# Patient Record
Sex: FEMALE | Race: WHITE | NOT HISPANIC OR LATINO
[De-identification: names, ages, dates, MRNs, and addresses within clinical notes are randomized per-mention and may not be internally consistent; named-entity substitution may affect disease eponyms.]

---

## 2017-01-03 ENCOUNTER — MEDICATION RENEWAL (OUTPATIENT)
Age: 72
End: 2017-01-03

## 2017-02-06 ENCOUNTER — APPOINTMENT (OUTPATIENT)
Dept: INTERNAL MEDICINE | Facility: CLINIC | Age: 72
End: 2017-02-06

## 2017-02-14 ENCOUNTER — APPOINTMENT (OUTPATIENT)
Dept: INTERNAL MEDICINE | Facility: CLINIC | Age: 72
End: 2017-02-14

## 2017-02-14 VITALS
HEART RATE: 78 BPM | OXYGEN SATURATION: 98 % | DIASTOLIC BLOOD PRESSURE: 50 MMHG | SYSTOLIC BLOOD PRESSURE: 90 MMHG | TEMPERATURE: 98.2 F

## 2017-02-14 DIAGNOSIS — Z23 ENCOUNTER FOR IMMUNIZATION: ICD-10-CM

## 2017-02-14 DIAGNOSIS — L97.509 OTHER SPECIFIED DIABETES MELLITUS WITH FOOT ULCER: ICD-10-CM

## 2017-02-14 DIAGNOSIS — E13.621 OTHER SPECIFIED DIABETES MELLITUS WITH FOOT ULCER: ICD-10-CM

## 2017-02-14 DIAGNOSIS — M86.9 OSTEOMYELITIS, UNSPECIFIED: ICD-10-CM

## 2017-02-15 LAB
25(OH)D3 SERPL-MCNC: 22 NG/ML
ALBUMIN SERPL ELPH-MCNC: 3.3 G/DL
ALP BLD-CCNC: 138 U/L
ALT SERPL-CCNC: 13 U/L
ANION GAP SERPL CALC-SCNC: 14 MMOL/L
AST SERPL-CCNC: 22 U/L
BASOPHILS # BLD AUTO: 0.09 K/UL
BASOPHILS NFR BLD AUTO: 1.3 %
BILIRUB SERPL-MCNC: 0.2 MG/DL
BUN SERPL-MCNC: 19 MG/DL
CALCIUM SERPL-MCNC: 9.1 MG/DL
CHLORIDE SERPL-SCNC: 104 MMOL/L
CO2 SERPL-SCNC: 27 MMOL/L
CREAT SERPL-MCNC: 0.92 MG/DL
EOSINOPHIL # BLD AUTO: 0.51 K/UL
EOSINOPHIL NFR BLD AUTO: 7.3 %
GLUCOSE SERPL-MCNC: 86 MG/DL
HBA1C MFR BLD HPLC: 8 %
HCT VFR BLD CALC: 33.2 %
HGB BLD-MCNC: 9.7 G/DL
IMM GRANULOCYTES NFR BLD AUTO: 0.1 %
LYMPHOCYTES # BLD AUTO: 1.53 K/UL
LYMPHOCYTES NFR BLD AUTO: 22 %
MAN DIFF?: NORMAL
MCHC RBC-ENTMCNC: 27.6 PG
MCHC RBC-ENTMCNC: 29.2 GM/DL
MCV RBC AUTO: 94.3 FL
MONOCYTES # BLD AUTO: 0.52 K/UL
MONOCYTES NFR BLD AUTO: 7.5 %
NEUTROPHILS # BLD AUTO: 4.3 K/UL
NEUTROPHILS NFR BLD AUTO: 61.8 %
PLATELET # BLD AUTO: 274 K/UL
POTASSIUM SERPL-SCNC: 3.6 MMOL/L
PROT SERPL-MCNC: 6.9 G/DL
RBC # BLD: 3.52 M/UL
RBC # FLD: 16.2 %
SODIUM SERPL-SCNC: 145 MMOL/L
TSH SERPL-ACNC: 3.1 UIU/ML
VANCOMYCIN TROUGH SERPL-MCNC: 4 UG/ML
WBC # FLD AUTO: 6.96 K/UL

## 2017-02-16 PROBLEM — M86.9 OSTEOMYELITIS: Status: ACTIVE | Noted: 2017-02-16

## 2017-02-22 ENCOUNTER — APPOINTMENT (OUTPATIENT)
Dept: SURGERY | Facility: ASSISTED LIVING FACILITY | Age: 72
End: 2017-02-22

## 2017-03-01 ENCOUNTER — APPOINTMENT (OUTPATIENT)
Dept: SURGERY | Facility: ASSISTED LIVING FACILITY | Age: 72
End: 2017-03-01

## 2017-04-13 ENCOUNTER — INPATIENT (INPATIENT)
Facility: HOSPITAL | Age: 72
LOS: 14 days | Discharge: HOME | DRG: 853 | End: 2017-04-28
Attending: HOSPITALIST | Admitting: HOSPITALIST
Payer: MEDICARE

## 2017-04-13 VITALS
HEART RATE: 74 BPM | RESPIRATION RATE: 18 BRPM | DIASTOLIC BLOOD PRESSURE: 44 MMHG | OXYGEN SATURATION: 98 % | SYSTOLIC BLOOD PRESSURE: 86 MMHG | TEMPERATURE: 98 F

## 2017-04-13 DIAGNOSIS — Z71.89 OTHER SPECIFIED COUNSELING: ICD-10-CM

## 2017-04-13 DIAGNOSIS — R41.82 ALTERED MENTAL STATUS, UNSPECIFIED: ICD-10-CM

## 2017-04-13 DIAGNOSIS — R10.9 UNSPECIFIED ABDOMINAL PAIN: ICD-10-CM

## 2017-04-13 DIAGNOSIS — E10.9 TYPE 1 DIABETES MELLITUS WITHOUT COMPLICATIONS: ICD-10-CM

## 2017-04-13 DIAGNOSIS — I50.22 CHRONIC SYSTOLIC (CONGESTIVE) HEART FAILURE: ICD-10-CM

## 2017-04-13 DIAGNOSIS — F32.9 MAJOR DEPRESSIVE DISORDER, SINGLE EPISODE, UNSPECIFIED: ICD-10-CM

## 2017-04-13 DIAGNOSIS — Z41.8 ENCOUNTER FOR OTHER PROCEDURES FOR PURPOSES OTHER THAN REMEDYING HEALTH STATE: ICD-10-CM

## 2017-04-13 DIAGNOSIS — Z98.89 OTHER SPECIFIED POSTPROCEDURAL STATES: Chronic | ICD-10-CM

## 2017-04-13 DIAGNOSIS — A41.9 SEPSIS, UNSPECIFIED ORGANISM: ICD-10-CM

## 2017-04-13 LAB
ALBUMIN SERPL ELPH-MCNC: 2.3 G/DL — LOW (ref 3.3–5)
ALP SERPL-CCNC: 171 U/L — HIGH (ref 40–120)
ALT FLD-CCNC: 16 U/L RC — SIGNIFICANT CHANGE UP (ref 10–45)
ANION GAP SERPL CALC-SCNC: 18 MMOL/L — HIGH (ref 5–17)
APPEARANCE UR: CLEAR — SIGNIFICANT CHANGE UP
APTT BLD: 29.1 SEC — SIGNIFICANT CHANGE UP (ref 27.5–37.4)
AST SERPL-CCNC: 13 U/L — SIGNIFICANT CHANGE UP (ref 10–40)
BASOPHILS # BLD AUTO: 0 K/UL — SIGNIFICANT CHANGE UP (ref 0–0.2)
BASOPHILS NFR BLD AUTO: 0.2 % — SIGNIFICANT CHANGE UP (ref 0–2)
BILIRUB SERPL-MCNC: 0.3 MG/DL — SIGNIFICANT CHANGE UP (ref 0.2–1.2)
BILIRUB UR-MCNC: NEGATIVE — SIGNIFICANT CHANGE UP
BLD GP AB SCN SERPL QL: NEGATIVE — SIGNIFICANT CHANGE UP
BUN SERPL-MCNC: 54 MG/DL — HIGH (ref 7–23)
CALCIUM SERPL-MCNC: 8.9 MG/DL — SIGNIFICANT CHANGE UP (ref 8.4–10.5)
CHLORIDE SERPL-SCNC: 98 MMOL/L — SIGNIFICANT CHANGE UP (ref 96–108)
CK MB BLD-MCNC: 7.9 % — HIGH (ref 0–3.5)
CK MB CFR SERPL CALC: 1.5 NG/ML — SIGNIFICANT CHANGE UP (ref 0–3.8)
CK SERPL-CCNC: 19 U/L — LOW (ref 25–170)
CO2 SERPL-SCNC: 22 MMOL/L — SIGNIFICANT CHANGE UP (ref 22–31)
COLOR SPEC: YELLOW — SIGNIFICANT CHANGE UP
CREAT SERPL-MCNC: 1.97 MG/DL — HIGH (ref 0.5–1.3)
DIFF PNL FLD: NEGATIVE — SIGNIFICANT CHANGE UP
DIGOXIN SERPL-MCNC: 1.6 NG/ML — SIGNIFICANT CHANGE UP (ref 0.8–2)
EOSINOPHIL # BLD AUTO: 0 K/UL — SIGNIFICANT CHANGE UP (ref 0–0.5)
EOSINOPHIL NFR BLD AUTO: 0.2 % — SIGNIFICANT CHANGE UP (ref 0–6)
EPI CELLS # UR: SIGNIFICANT CHANGE UP /HPF
GAS PNL BLDV: SIGNIFICANT CHANGE UP
GAS PNL BLDV: SIGNIFICANT CHANGE UP
GLUCOSE SERPL-MCNC: 130 MG/DL — HIGH (ref 70–99)
GLUCOSE UR QL: NEGATIVE — SIGNIFICANT CHANGE UP
HCT VFR BLD CALC: 27.1 % — LOW (ref 34.5–45)
HGB BLD-MCNC: 8.8 G/DL — LOW (ref 11.5–15.5)
HYALINE CASTS # UR AUTO: ABNORMAL
INR BLD: 1.26 RATIO — HIGH (ref 0.88–1.16)
KETONES UR-MCNC: NEGATIVE — SIGNIFICANT CHANGE UP
LEUKOCYTE ESTERASE UR-ACNC: NEGATIVE — SIGNIFICANT CHANGE UP
LIDOCAIN IGE QN: 8 U/L — SIGNIFICANT CHANGE UP (ref 7–60)
LYMPHOCYTES # BLD AUTO: 0.7 K/UL — LOW (ref 1–3.3)
LYMPHOCYTES # BLD AUTO: 3.6 % — LOW (ref 13–44)
MAGNESIUM SERPL-MCNC: 2.3 MG/DL — SIGNIFICANT CHANGE UP (ref 1.6–2.6)
MCHC RBC-ENTMCNC: 27.9 PG — SIGNIFICANT CHANGE UP (ref 27–34)
MCHC RBC-ENTMCNC: 32.6 GM/DL — SIGNIFICANT CHANGE UP (ref 32–36)
MCV RBC AUTO: 85.5 FL — SIGNIFICANT CHANGE UP (ref 80–100)
MONOCYTES # BLD AUTO: 0.8 K/UL — SIGNIFICANT CHANGE UP (ref 0–0.9)
MONOCYTES NFR BLD AUTO: 3.9 % — SIGNIFICANT CHANGE UP (ref 2–14)
NEUTROPHILS # BLD AUTO: 18.3 K/UL — HIGH (ref 1.8–7.4)
NEUTROPHILS NFR BLD AUTO: 92.2 % — HIGH (ref 43–77)
NITRITE UR-MCNC: NEGATIVE — SIGNIFICANT CHANGE UP
PH UR: 5.5 — SIGNIFICANT CHANGE UP (ref 4.8–8)
PHOSPHATE SERPL-MCNC: 3.2 MG/DL — SIGNIFICANT CHANGE UP (ref 2.5–4.5)
PLATELET # BLD AUTO: 431 K/UL — HIGH (ref 150–400)
POTASSIUM SERPL-MCNC: 4 MMOL/L — SIGNIFICANT CHANGE UP (ref 3.5–5.3)
POTASSIUM SERPL-SCNC: 4 MMOL/L — SIGNIFICANT CHANGE UP (ref 3.5–5.3)
PROT SERPL-MCNC: 6.2 G/DL — SIGNIFICANT CHANGE UP (ref 6–8.3)
PROT UR-MCNC: SIGNIFICANT CHANGE UP
PROTHROM AB SERPL-ACNC: 13.7 SEC — HIGH (ref 9.8–12.7)
RBC # BLD: 3.17 M/UL — LOW (ref 3.8–5.2)
RBC # FLD: 15.1 % — HIGH (ref 10.3–14.5)
RBC CASTS # UR COMP ASSIST: SIGNIFICANT CHANGE UP /HPF (ref 0–2)
RH IG SCN BLD-IMP: POSITIVE — SIGNIFICANT CHANGE UP
SODIUM SERPL-SCNC: 138 MMOL/L — SIGNIFICANT CHANGE UP (ref 135–145)
SP GR SPEC: 1.01 — SIGNIFICANT CHANGE UP (ref 1.01–1.02)
TROPONIN T SERPL-MCNC: 0.13 NG/ML — HIGH (ref 0–0.06)
UROBILINOGEN FLD QL: NEGATIVE — SIGNIFICANT CHANGE UP
WBC # BLD: 19.9 K/UL — HIGH (ref 3.8–10.5)
WBC # FLD AUTO: 19.9 K/UL — HIGH (ref 3.8–10.5)
WBC UR QL: SIGNIFICANT CHANGE UP /HPF (ref 0–5)

## 2017-04-13 PROCEDURE — 70250 X-RAY EXAM OF SKULL: CPT | Mod: 26

## 2017-04-13 PROCEDURE — 74177 CT ABD & PELVIS W/CONTRAST: CPT | Mod: 26

## 2017-04-13 PROCEDURE — 71010: CPT | Mod: 26

## 2017-04-13 PROCEDURE — 99285 EMERGENCY DEPT VISIT HI MDM: CPT | Mod: GC

## 2017-04-13 PROCEDURE — 74000: CPT | Mod: 26

## 2017-04-13 PROCEDURE — 70450 CT HEAD/BRAIN W/O DYE: CPT | Mod: 26

## 2017-04-13 RX ORDER — INSULIN GLARGINE 100 [IU]/ML
14 INJECTION, SOLUTION SUBCUTANEOUS ONCE
Qty: 0 | Refills: 0 | Status: DISCONTINUED | OUTPATIENT
Start: 2017-04-13 | End: 2017-04-13

## 2017-04-13 RX ORDER — DEXTROSE 50 % IN WATER 50 %
25 SYRINGE (ML) INTRAVENOUS ONCE
Qty: 0 | Refills: 0 | Status: DISCONTINUED | OUTPATIENT
Start: 2017-04-13 | End: 2017-04-24

## 2017-04-13 RX ORDER — SODIUM CHLORIDE 9 MG/ML
1000 INJECTION, SOLUTION INTRAVENOUS
Qty: 0 | Refills: 0 | Status: DISCONTINUED | OUTPATIENT
Start: 2017-04-13 | End: 2017-04-24

## 2017-04-13 RX ORDER — HEPARIN SODIUM 5000 [USP'U]/ML
5000 INJECTION INTRAVENOUS; SUBCUTANEOUS EVERY 8 HOURS
Qty: 0 | Refills: 0 | Status: DISCONTINUED | OUTPATIENT
Start: 2017-04-13 | End: 2017-04-21

## 2017-04-13 RX ORDER — SODIUM CHLORIDE 9 MG/ML
1000 INJECTION INTRAMUSCULAR; INTRAVENOUS; SUBCUTANEOUS ONCE
Qty: 0 | Refills: 0 | Status: COMPLETED | OUTPATIENT
Start: 2017-04-13 | End: 2017-04-13

## 2017-04-13 RX ORDER — DEXTROSE 50 % IN WATER 50 %
12.5 SYRINGE (ML) INTRAVENOUS ONCE
Qty: 0 | Refills: 0 | Status: DISCONTINUED | OUTPATIENT
Start: 2017-04-13 | End: 2017-04-24

## 2017-04-13 RX ORDER — DEXTROSE 50 % IN WATER 50 %
1 SYRINGE (ML) INTRAVENOUS ONCE
Qty: 0 | Refills: 0 | Status: DISCONTINUED | OUTPATIENT
Start: 2017-04-13 | End: 2017-04-24

## 2017-04-13 RX ORDER — GLUCAGON INJECTION, SOLUTION 0.5 MG/.1ML
1 INJECTION, SOLUTION SUBCUTANEOUS ONCE
Qty: 0 | Refills: 0 | Status: DISCONTINUED | OUTPATIENT
Start: 2017-04-13 | End: 2017-04-24

## 2017-04-13 RX ORDER — SODIUM CHLORIDE 9 MG/ML
1000 INJECTION, SOLUTION INTRAVENOUS
Qty: 0 | Refills: 0 | Status: DISCONTINUED | OUTPATIENT
Start: 2017-04-13 | End: 2017-04-14

## 2017-04-13 RX ORDER — FENTANYL CITRATE 50 UG/ML
50 INJECTION INTRAVENOUS ONCE
Qty: 0 | Refills: 0 | Status: DISCONTINUED | OUTPATIENT
Start: 2017-04-13 | End: 2017-04-13

## 2017-04-13 RX ORDER — CEFEPIME 1 G/1
1000 INJECTION, POWDER, FOR SOLUTION INTRAMUSCULAR; INTRAVENOUS ONCE
Qty: 0 | Refills: 0 | Status: COMPLETED | OUTPATIENT
Start: 2017-04-13 | End: 2017-04-13

## 2017-04-13 RX ORDER — DOCUSATE SODIUM 100 MG
100 CAPSULE ORAL
Qty: 0 | Refills: 0 | Status: DISCONTINUED | OUTPATIENT
Start: 2017-04-13 | End: 2017-04-24

## 2017-04-13 RX ORDER — INSULIN LISPRO 100/ML
VIAL (ML) SUBCUTANEOUS EVERY 4 HOURS
Qty: 0 | Refills: 0 | Status: DISCONTINUED | OUTPATIENT
Start: 2017-04-13 | End: 2017-04-14

## 2017-04-13 RX ORDER — INSULIN GLARGINE 100 [IU]/ML
14 INJECTION, SOLUTION SUBCUTANEOUS AT BEDTIME
Qty: 0 | Refills: 0 | Status: DISCONTINUED | OUTPATIENT
Start: 2017-04-14 | End: 2017-04-14

## 2017-04-13 RX ORDER — SENNA PLUS 8.6 MG/1
2 TABLET ORAL AT BEDTIME
Qty: 0 | Refills: 0 | Status: DISCONTINUED | OUTPATIENT
Start: 2017-04-13 | End: 2017-04-24

## 2017-04-13 RX ORDER — INSULIN GLARGINE 100 [IU]/ML
13 INJECTION, SOLUTION SUBCUTANEOUS ONCE
Qty: 0 | Refills: 0 | Status: COMPLETED | OUTPATIENT
Start: 2017-04-13 | End: 2017-04-13

## 2017-04-13 RX ORDER — INSULIN HUMAN 100 [IU]/ML
0 INJECTION, SOLUTION SUBCUTANEOUS
Qty: 0 | Refills: 0 | COMMUNITY

## 2017-04-13 RX ORDER — DEXTROSE 50 % IN WATER 50 %
25 SYRINGE (ML) INTRAVENOUS ONCE
Qty: 0 | Refills: 0 | Status: COMPLETED | OUTPATIENT
Start: 2017-04-13 | End: 2017-04-13

## 2017-04-13 RX ORDER — VANCOMYCIN HCL 1 G
1000 VIAL (EA) INTRAVENOUS ONCE
Qty: 0 | Refills: 0 | Status: COMPLETED | OUTPATIENT
Start: 2017-04-13 | End: 2017-04-13

## 2017-04-13 RX ADMIN — CEFEPIME 100 MILLIGRAM(S): 1 INJECTION, POWDER, FOR SOLUTION INTRAMUSCULAR; INTRAVENOUS at 16:41

## 2017-04-13 RX ADMIN — Medication 250 MILLIGRAM(S): at 17:43

## 2017-04-13 RX ADMIN — SODIUM CHLORIDE 50 MILLILITER(S): 9 INJECTION, SOLUTION INTRAVENOUS at 22:47

## 2017-04-13 RX ADMIN — SODIUM CHLORIDE 2000 MILLILITER(S): 9 INJECTION INTRAMUSCULAR; INTRAVENOUS; SUBCUTANEOUS at 16:19

## 2017-04-13 RX ADMIN — Medication 25 MILLILITER(S): at 22:20

## 2017-04-13 RX ADMIN — Medication 25 MILLILITER(S): at 19:23

## 2017-04-13 RX ADMIN — INSULIN GLARGINE 13 UNIT(S): 100 INJECTION, SOLUTION SUBCUTANEOUS at 16:44

## 2017-04-13 RX ADMIN — FENTANYL CITRATE 50 MICROGRAM(S): 50 INJECTION INTRAVENOUS at 17:42

## 2017-04-13 RX ADMIN — SODIUM CHLORIDE 2000 MILLILITER(S): 9 INJECTION INTRAMUSCULAR; INTRAVENOUS; SUBCUTANEOUS at 16:40

## 2017-04-13 RX ADMIN — FENTANYL CITRATE 50 MICROGRAM(S): 50 INJECTION INTRAVENOUS at 18:00

## 2017-04-13 NOTE — H&P ADULT. - ATTENDING COMMENTS
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 Also single episode of NBNB emesis. Patient has a history of refractory constipation requiring neostigmine.   tailors daily bowel regimen to her daily habits, alternating between senna and imodium. 3 days ago, patient was having diarrhea,  gave her imodium.  Since then, one BM daily, formed.  also notes that the patient has been taking poor POs, had single episode of NBNB emesis, and he has noted increased abdominal distension.  She has been more lethargic and less responsive of the last 2 days.  In ED, HR 70-80, /70, Tmax 98.7, satting well on RA (was briefly on NC for unclear reasons, but trialed off and satting well).  On exam, patient is lethargic but responsive.  Abdominal exam is distended and tympanic, but no TTP.  Labs notable for leukocytosis to 19.9, Hg of 8.8 (stable), MIGUEL to 1.97 (BL 0.9), albumin of 2.3, INR 1.26, lactate initially 4.1, improved to 1.1 with fluids. 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 Also single episode of NBNB emesis. Patient has a history of refractory constipation requiring neostigmine.   tailors daily bowel regimen to her daily habits, alternating between senna and imodium. 3 days ago, patient was having diarrhea,  gave her imodium.  Since then, one BM daily, formed.  also notes that the patient has been taking poor POs, had single episode of NBNB emesis, and he has noted increased abdominal distension.  She has been more lethargic and less responsive of the last 2 days.  In ED, HR 70-80, /70, Tmax 98.7, satting well on RA (was briefly on NC for unclear reasons, but trialed off and satting well).  On exam, patient is rousable and appropriately responsive.  She is AOx2 (oriented to person and place, unable to tell me date).  She denies pain of any sort, states that she is comfortable.  Abdominal exam is distended and tympanic, but otherwise benign with normal bowel sounds and no TTP.  Labs notable for leukocytosis to 19.9, Hg of 8.8 (stable), MIGUEL to 1.97 (BL 0.9), albumin of 2.3, INR 1.26, lactate initially 4.1, improved to 1.1 with fluids.  CT A/P with severe constipation, also with small bilteral pleural effusions and mild atalectasis bilaterally. A/P Severe constipation likely 2/2 strictures from prior abdominal surgeries. Suspect that lethargy and leukocytosis are both 2/2 this, with possible stercoral ulceration. No other clear sources for infection, vital signs and exam benign, CXR clear, UA without signs of infection, CT A/P also without any clear inflammation. Cultures pending. Doubt CNS infection given my exam. Given constipation and concerns for colitis, will treat for GI source with cipro flagyl for now.  Rigorous bowel regimen with senna/colace, dulcolax suppositories.  Advance as needed to mag citrate/lactulose. MIGUEL likely 2/2 poor POs, though should rule out-post renal 2/2 constipation obstruction, will send bilateral renal U/S, continue D5 half NS drip.  Suspect that encephalopathy is 2/2 pain and also dehydration, patient is high risk for encephalopathy given poor substrate with multiple strokes, hydrocephalus and depression. Neuro c/s given  shunt and possible contribution of hydrocephalus.  Will complete encephalitis w/u with TSH, B12, folate.  Consider some hypoglecemia given poor POs with pump, thought patient not hypoglycemic on arrival, seen by endo and advised lantus 14 with D5 drip, appreciate their assistance.  DNR/DNI confirmed, 30 minutes of advanced care planning spent, family will bring documentation in AM. 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 Also single episode of NBNB emesis. Patient has a history of refractory constipation requiring neostigmine.   tailors daily bowel regimen to her daily habits, alternating between senna and imodium. 3 days ago, patient was having diarrhea,  gave her imodium.  Since then, one BM daily, formed.  also notes that the patient has been taking poor POs, had single episode of NBNB emesis, and he has noted increased abdominal distension.  She has been more lethargic and less responsive of the last 2 days.  In ED, HR 70-80, /70, Tmax 98.7, satting well on RA (was briefly on NC for unclear reasons, but trialed off and satting well).  On exam, patient is rousable and appropriately responsive.  She is AOx2 (oriented to person and place, unable to tell me date).  She denies pain of any sort, states that she is comfortable.  Abdominal exam is distended and tympanic, but otherwise benign with normal bowel sounds and no TTP.  Labs notable for leukocytosis to 19.9, Hg of 8.8 (stable), MIGUEL to 1.97 (BL 0.9), albumin of 2.3, INR 1.26, lactate initially 4.1, improved to 1.1 with fluids.  CT A/P with severe constipation, also with small bilteral pleural effusions and mild atalectasis bilaterally. A/P Severe constipation likely 2/2 strictures from prior abdominal surgeries. Suspect that lethargy and leukocytosis are both 2/2 this, with possible stercoral ulceration. No other clear sources for infection, vital signs and exam benign, CXR clear, UA without signs of infection, CT A/P also without any clear inflammation. Cultures pending. Doubt CNS infection given my exam. Given constipation and concerns for colitis, will treat for GI source with cipro flagyl for now.  Rigorous bowel regimen with senna/colace, dulcolax suppositories.  Advance as needed to mag citrate/lactulose. MIGUEL likely 2/2 poor POs, though should rule out-post renal 2/2 constipation obstruction, will send bilateral renal U/S (though CT wthout gross hydronephrosis), continue D5 half NS drip.  Suspect that encephalopathy is 2/2 pain and also dehydration, patient is high risk for encephalopathy given poor substrate with multiple strokes, hydrocephalus and depression. Neuro c/s given  shunt and possible contribution of hydrocephalus.  Will complete encephalitis w/u with TSH, B12, folate.  Consider some hypoglecemia given poor POs with pump, thought patient not hypoglycemic on arrival, seen by carisa and advised lantus 14 with D5 drip, appreciate their assistance.  DNR/DNI confirmed, 30 minutes of advanced care planning spent, family will bring documentation in AM.

## 2017-04-13 NOTE — H&P ADULT. - PROBLEM SELECTOR PLAN 7
Discussed with  about advanced directive  - reports he has paper at home expressing DNR/DNI wishes, which he will bring to hospital  -patient DNR/DNI at this time Patient with hx of HF, although no echo seen in allscripts or sunrise  -currently hypovolemic, will treat with gentle IVF and continue to monitoring

## 2017-04-13 NOTE — ED ADULT NURSE REASSESSMENT NOTE - NS ED NURSE REASSESS COMMENT FT1
Report recived from Shante KING @ [id number]. Pt A&Ox2 to person and place. VS stable, pt in no acute distress. Safety and comfort provided. Report received from Shante KING @ [id number]. Pt A&Ox2 to person and place. VS stable, pt in no acute distress. abdomen remains distended. Safety and comfort provided.

## 2017-04-13 NOTE — H&P ADULT. - ASSESSMENT
72 yo M with history of dementia, DM1 on insulin pump, hx of multiple CVA (bilateral LE weakness and dysarthria), HFrEF s/p AICD, depression, hx of volvulus hx w/ sigmoid resection on 3/2013, hx of RLE osteomyelitis (wound vac changes M/W/F) finished about presents for abdominal pain x few days. 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

## 2017-04-13 NOTE — H&P ADULT. - EKG AND INTERPRETATION
EKG: Sinus rhythm with HR of about 75bpm, LBBB Tracing personally reviewed. EKG: Sinus rhythm with HR of about 75bpm, LBBB

## 2017-04-13 NOTE — H&P ADULT. - HISTORY OF PRESENT ILLNESS
70 yo M with history of dementia, DM1 on insulin pump, hx of multiple CVA (bilateral LE weakness and dysarthria), HFrEF s/p AICD, depression, hx of volvulus hx w/ sigmoid resection on 3/2013, hx of RLE osteomyelitis (wound vac changes M/W/F) finished about presents for abdominal pain x few days.  is providing history via telephone as patient is currently altered and history of dementia.  reports that over past few days, his wife had started reporting that her stomach has been bothering her over the past few days. She had become distended at that time and has not been eating over the last several days. She reports that she had an episode of NBNV vomiting. He reports that she has been having a BM every day, with abut two episodes. He changes the bowel regimen depending on how her BM are that day. Last time he gave her any bowel stimulant was a couple days ago with Senna. He reports that at time he used imodium, said did not use it for a few days, but uses it depending on her BM.     At baseline, patient is minimal ambulatory, bound to wheelchair. 70 yo M with history of dementia, DM1 on insulin pump, hx of multiple CVA (bilateral LE weakness and dysarthria), HFrEF s/p AICD, depression, hx of volvulus hx w/ sigmoid resection on 3/2013, hx of RLE osteomyelitis (wound vac changes M/W/F) finished about presents for abdominal pain x few days.  is providing history via telephone as patient is currently altered and history of dementia.  reports that over past few days, his wife had started reporting that her stomach has been bothering her over the past few days. She had become distended at that time and has not been eating over the last several days. She reports that she had an episode of NBNV vomiting. He reports that she has been having a BM every day, with abut two episodes. He changes the bowel regimen depending on how her BM are that day. Last time he gave her any bowel stimulant was a couple days ago with Senna. He reports that at time he used imodium, said did not use it for a few days, but uses it depending on her BM.     At baseline, patient is minimal ambulatory, bound to wheelchair.     Patient has had a history of constipation. In the past, patient has required neostigmine with GI.  reports he had a sphincterotomy in past around 2015.

## 2017-04-13 NOTE — ED ADULT NURSE NOTE - PSH
AICD (Automatic Cardioverter/Defibrillator) Present     delivery NOS    H/O colectomy    History of

## 2017-04-13 NOTE — ED PROVIDER NOTE - ATTENDING CONTRIBUTION TO CARE
Tori:  I have independently evaluated the patient and have documented in the appropriate sections above.  I agree with the exam and plan as noted above.

## 2017-04-13 NOTE — H&P ADULT. - PROBLEM SELECTOR PLAN 5
Patient with hx of depression and anxiety  -patient on paroxetine and Nuedexta ( reports patient had episode of crying) Patient with hx of DM1  -endocrinology c/s appreciated: at this time, lantus 14 U qhs, IVF D5W 1/2 NS, humalog sliding scale q4h  -ISS, FSG

## 2017-04-13 NOTE — ED PROVIDER NOTE - NOTES
states that she is aware, recommends home dose of insulin, and states that Dr. Arroyo needs to be informed since it is his patient Pt's insulin pump remove. Endocrine fellow paged - states that she is aware, recommends home dose of insulin, and states that Dr. Arroyo needs to be informed since it is his patient recommends admission to medicine

## 2017-04-13 NOTE — H&P ADULT. - RADIOLOGY RESULTS AND INTERPRETATION
Personally reviewed:   CT A/P: Fecal impaction with large stool burden causing significant distention of   the rectosigmoid colon.  CT Head: Status post right frontal approach ventriculostomy catheter in unchanged   position. Unchanged ventricular sizes. No mass effect or intracranial   hemorrhage.  Abdomen X-ray: Gaseous distension indicating large stool   CXR: pending

## 2017-04-13 NOTE — H&P ADULT. - PROBLEM SELECTOR PLAN 1
Patient with AMS, likely 2/2 stool impaction and colitis  -CT A/P: patient with stool impaction, with leukocytosis, concerning for stercoral colitis - will treat with suppository and enema, will also treat with aztreonam (in hx of allergy)   -r/o structural: CT head with no structural changes, neurology consult with history of  shunt  -r/o metabolic: Patient with hypoglycemia seen on VBG, with hx of decreased PO intake and on insulin - endocrinology c/s appreciated: will do lantus at this time with D5 IVF Patient with AMS, likely 2/2 stool impaction and colitis  -CT A/P: patient with stool impaction, with leukocytosis, concerning for stercoral colitis - will treat with suppository and enema as needed, will also treat with ciprofloxacin/metronidazole  -r/o structural: CT head with no structural changes, neurology consult with history of  shunt  -metabolic: Patient with hypoglycemia seen on VBG, with hx of decreased PO intake and on insulin - endocrinology c/s appreciated: will do lantus at this time with D5 IVF. Continue with monitoring FSG closesly

## 2017-04-13 NOTE — ED PROVIDER NOTE - OBJECTIVE STATEMENT
71 year old female with PMHx of CHF s/p AICD, DM type I on insulin pump, HTN, HLD, CVA in past with residual LE weakness, osteomyelitis R LE, depression, h/o  shunt placement for hydrocephalus, and nonambulatory at baseline presenting with altered mental status and abdominal pain. Her , at bedside, states that she is less alert and interactive than she normally is. She was "in bed all day" yesterday complaining of abdominal pain, and on arrival is not answering questions, only moaning. 71 year old female with PMHx of CHF s/p AICD, DM type I on insulin pump, HTN, HLD, CVA in past with residual LE weakness, osteomyelitis R LE, depression, h/o  shunt placement for hydrocephalus, and nonambulatory at baseline presenting with altered mental status and abdominal pain. Her , at bedside, states that she is less alert and interactive than she normally is. She was "in bed all day" yesterday complaining of abdominal pain, and on arrival is not answering questions, only moaning.    Tori:  AMS in diabetic patient

## 2017-04-13 NOTE — ED ADULT NURSE NOTE - OBJECTIVE STATEMENT
70 y/o female brought in for AMS. Pmhx shunt, type 1 DM on insulin pump. Pt. A&Ox2 baseline disorientation, gross neuros intact, VSS, denies chest pain/SOB. LS clear and equal bilaterally all throughout. ABD distended, prior to arriving, pt c/o decreased PO intake, n/v. Denies blood in urine and/or stool. Skin intact. Peripheral pulses strong and normal baseline sensation present x4. Safety and comfort measures maintained. insulin pump removed by  at bedside upon patient arrival. Endo paged at 1325 to consult patient. 72 y/o female brought in for AMS. Pmhx shunt, type 1 DM on insulin pump. Pt. A&Ox2 baseline disorientation, gross neuros intact, VSS, denies chest pain/SOB. LS clear and equal bilaterally all throughout. ABD distended, prior to arriving, pt c/o decreased PO intake, n/v. Denies blood in urine and/or stool. Skin intact. Peripheral pulses strong and normal baseline sensation present x4. Safety and comfort measures maintained. insulin pump removed by  at bedside upon patient arrival. Ronda paged at 1615 to consult patient. Dr. Thomas spoke to ronda requesting lantus. 70 y/o female brought in for AMS. Pmhx shunt, type 1 DM on insulin pump. Pt. A&Ox2 baseline disorientation, gross neuros intact, VSS, denies chest pain/SOB. LS clear and equal bilaterally all throughout. ABD distended, prior to arriving, pt c/o decreased PO intake, n/v. Denies blood in urine and/or stool. Skin intact. Peripheral pulses strong and normal baseline sensation present x4. Safety and comfort measures maintained. insulin pump removed by  at bedside upon patient arrival. Endo paged at 1615 to consult patient. Dr. hTomas spoke to endo requesting lantus.    --   At 2208 pt FS 57, 1 amp D50 administered as per MD order and D5 drip to be initiated at 50ml/hr as per MD order. Mental status remains unchanged - pt remains A&Ox2 to person and place. Repeat FS at [id number]. Safety and comfort maintained.  - Milly Gomes RN

## 2017-04-13 NOTE — H&P ADULT. - PROBLEM SELECTOR PLAN 8
DVT ppx: HSQ Discussed with  about advanced directive  - reports he has paper at home expressing DNR/DNI wishes, which he will bring to hospital  -patient DNR/DNI at this time

## 2017-04-13 NOTE — ED PROVIDER NOTE - MEDICAL DECISION MAKING DETAILS
71 year old female with multiple medical problems,  shunt, CVA in past, p/w abdominal pain and distension that started yesterday accompanied by altered mental status. On exam abdomen is severely distended, rigid, with reducible hernia. DDx includes perforation (stat portable upright CXR ordered), (stercoral) colitis, ischemic bowel, encephalitis, will perform work up (sepsis labs with lipase and stat CT), obtain surgery and endocrine consults, give fluids antibiotics and reassess

## 2017-04-13 NOTE — H&P ADULT. - PROBLEM SELECTOR PLAN 2
Patient with abdominal pain and stool impaction  -will treat with stool bowel regimen - surgery consulted: at this time, no acute surgical intervention, treat with bowel regimen Patient with abdominal pain and stool impaction, failed manual disimpaction in the ED  -will treat with stool bowel regimen - surgery consulted: at this time, no acute surgical intervention, treat with bowel regimen  -f/u BM, will likely need GI consult in AM if not improving BM (pt has history of neostigmine usage) Patient with abdominal pain and stool impaction, failed manual disimpaction in the ED, with significant leukocytosis and lactate  -will treat with stool bowel regimen - surgery consulted: at this time, no acute surgical intervention, treat with bowel regimen  -f/u BM, will likely need GI consult in AM if not improving BM (pt has history of neostigmine usage)

## 2017-04-13 NOTE — H&P ADULT. - PROBLEM SELECTOR PLAN 6
Patient with hx of HF, although no echo seen in allscripts or sunrise  -currently hypovolemic, will treat with gentle IVF and continue to monitoring Patient with hx of depression and anxiety  -patient on paroxetine and Nuedexta ( reports patient had episode of crying)

## 2017-04-13 NOTE — H&P ADULT. - MOTOR
patient following commands and gripping hands bilaterally, able to raise bilateral legs without support

## 2017-04-13 NOTE — ED ADULT NURSE NOTE - PMH
Arrhythmia    Carpal Tunnel Syndrome    Cerebral Vascular Accident    Congestive Heart Failure    High Cholesterol    Hydrocephalus    Hypertension    Juvenile Diabetes Mellitus    Peripheral Neuropathy

## 2017-04-13 NOTE — H&P ADULT. - PROBLEM SELECTOR PLAN 3
Patient with tachypnea, leukocytosis with possible colitis,  -treat colitis with aztreonam at this time,   -patient with no signs of UTI on UA or on exam, CXR pending but no clinical symptoms of PNA  -f/u blood cx, f/u urine cx, f/u CBC daily Patient with tachypnea, leukocytosis with possible colitis,  -treat colitis with ciprofloxacin and metronidazole at this time,   -patient with no signs of UTI on UA or on exam, CXR pending but no clinical symptoms of PNA  -f/u blood cx, f/u urine cx, f/u CBC daily

## 2017-04-13 NOTE — ED ADULT NURSE REASSESSMENT NOTE - NS ED NURSE REASSESS COMMENT FT1
Nelson inserted in ED as per MD order for monitoring, sterile technique maintained, UA and culture sent. Pt taken to radiology and returned to room, resting in bed with family at bedside.

## 2017-04-13 NOTE — H&P ADULT. - LAB RESULTS AND INTERPRETATION
Personally reviewed and interpreted:  CBC: Patient with significant leukocytosis to 19.9, Hg of 8.8, last CBC from [employer] with Hg of 9.7  BMP: Patient with elevated Cr from baseline (0.92 from 2/2017)  Albumin of 2.3, with INR of 1.26, indicating likely malnutrition  VBG: Lactate of 1.1

## 2017-04-14 ENCOUNTER — TRANSCRIPTION ENCOUNTER (OUTPATIENT)
Age: 72
End: 2017-04-14

## 2017-04-14 DIAGNOSIS — G93.40 ENCEPHALOPATHY, UNSPECIFIED: ICD-10-CM

## 2017-04-14 DIAGNOSIS — N17.9 ACUTE KIDNEY FAILURE, UNSPECIFIED: ICD-10-CM

## 2017-04-14 DIAGNOSIS — K52.9 NONINFECTIVE GASTROENTERITIS AND COLITIS, UNSPECIFIED: ICD-10-CM

## 2017-04-14 LAB
ANION GAP SERPL CALC-SCNC: 16 MMOL/L — SIGNIFICANT CHANGE UP (ref 5–17)
BASOPHILS # BLD AUTO: 0.1 K/UL — SIGNIFICANT CHANGE UP (ref 0–0.2)
BASOPHILS NFR BLD AUTO: 0.4 % — SIGNIFICANT CHANGE UP (ref 0–2)
BUN SERPL-MCNC: 48 MG/DL — HIGH (ref 7–23)
CALCIUM SERPL-MCNC: 8.4 MG/DL — SIGNIFICANT CHANGE UP (ref 8.4–10.5)
CHLORIDE SERPL-SCNC: 101 MMOL/L — SIGNIFICANT CHANGE UP (ref 96–108)
CK MB CFR SERPL CALC: 1.9 NG/ML — SIGNIFICANT CHANGE UP (ref 0–3.8)
CK SERPL-CCNC: 28 U/L — SIGNIFICANT CHANGE UP (ref 25–170)
CO2 SERPL-SCNC: 22 MMOL/L — SIGNIFICANT CHANGE UP (ref 22–31)
CREAT SERPL-MCNC: 1.69 MG/DL — HIGH (ref 0.5–1.3)
CULTURE RESULTS: NO GROWTH — SIGNIFICANT CHANGE UP
EOSINOPHIL # BLD AUTO: 0.2 K/UL — SIGNIFICANT CHANGE UP (ref 0–0.5)
EOSINOPHIL NFR BLD AUTO: 1.2 % — SIGNIFICANT CHANGE UP (ref 0–6)
FERRITIN SERPL-MCNC: 216.3 NG/ML — HIGH (ref 15–150)
FOLATE SERPL-MCNC: >20 NG/ML — SIGNIFICANT CHANGE UP (ref 4.8–24.2)
GLUCOSE SERPL-MCNC: 83 MG/DL — SIGNIFICANT CHANGE UP (ref 70–99)
HBA1C BLD-MCNC: 8.4 % — HIGH (ref 4–5.6)
HCT VFR BLD CALC: 24 % — LOW (ref 34.5–45)
HGB BLD-MCNC: 7.7 G/DL — LOW (ref 11.5–15.5)
IRON SATN MFR SERPL: 10 % — LOW (ref 14–50)
IRON SATN MFR SERPL: 15 UG/DL — LOW (ref 30–160)
LYMPHOCYTES # BLD AUTO: 0.6 K/UL — LOW (ref 1–3.3)
LYMPHOCYTES # BLD AUTO: 3 % — LOW (ref 13–44)
MAGNESIUM SERPL-MCNC: 2.2 MG/DL — SIGNIFICANT CHANGE UP (ref 1.6–2.6)
MCHC RBC-ENTMCNC: 27.6 PG — SIGNIFICANT CHANGE UP (ref 27–34)
MCHC RBC-ENTMCNC: 32 GM/DL — SIGNIFICANT CHANGE UP (ref 32–36)
MCV RBC AUTO: 86.3 FL — SIGNIFICANT CHANGE UP (ref 80–100)
MONOCYTES # BLD AUTO: 1.1 K/UL — HIGH (ref 0–0.9)
MONOCYTES NFR BLD AUTO: 5.6 % — SIGNIFICANT CHANGE UP (ref 2–14)
NEUTROPHILS # BLD AUTO: 18.2 K/UL — HIGH (ref 1.8–7.4)
NEUTROPHILS NFR BLD AUTO: 89.8 % — HIGH (ref 43–77)
PHOSPHATE SERPL-MCNC: 2.8 MG/DL — SIGNIFICANT CHANGE UP (ref 2.5–4.5)
PLATELET # BLD AUTO: 377 K/UL — SIGNIFICANT CHANGE UP (ref 150–400)
POTASSIUM SERPL-MCNC: 4.2 MMOL/L — SIGNIFICANT CHANGE UP (ref 3.5–5.3)
POTASSIUM SERPL-SCNC: 4.2 MMOL/L — SIGNIFICANT CHANGE UP (ref 3.5–5.3)
RBC # BLD: 2.79 M/UL — LOW (ref 3.8–5.2)
RBC # FLD: 15.2 % — HIGH (ref 10.3–14.5)
SODIUM SERPL-SCNC: 139 MMOL/L — SIGNIFICANT CHANGE UP (ref 135–145)
SPECIMEN SOURCE: SIGNIFICANT CHANGE UP
TIBC SERPL-MCNC: 155 UG/DL — LOW (ref 220–430)
TROPONIN T SERPL-MCNC: 0.13 NG/ML — HIGH (ref 0–0.06)
TSH SERPL-MCNC: 2.08 UIU/ML — SIGNIFICANT CHANGE UP (ref 0.27–4.2)
UIBC SERPL-MCNC: 140 UG/DL — SIGNIFICANT CHANGE UP (ref 110–370)
VIT B12 SERPL-MCNC: >2000 PG/ML — HIGH (ref 243–894)
WBC # BLD: 20.2 K/UL — HIGH (ref 3.8–10.5)
WBC # FLD AUTO: 20.2 K/UL — HIGH (ref 3.8–10.5)

## 2017-04-14 PROCEDURE — 99222 1ST HOSP IP/OBS MODERATE 55: CPT

## 2017-04-14 PROCEDURE — 74000: CPT | Mod: 26

## 2017-04-14 PROCEDURE — 99223 1ST HOSP IP/OBS HIGH 75: CPT | Mod: GC

## 2017-04-14 PROCEDURE — 71010: CPT | Mod: 26

## 2017-04-14 PROCEDURE — 99497 ADVNCD CARE PLAN 30 MIN: CPT | Mod: 25,GC

## 2017-04-14 RX ORDER — CIPROFLOXACIN LACTATE 400MG/40ML
400 VIAL (ML) INTRAVENOUS ONCE
Qty: 0 | Refills: 0 | Status: COMPLETED | OUTPATIENT
Start: 2017-04-14 | End: 2017-04-14

## 2017-04-14 RX ORDER — ASPIRIN AND DIPYRIDAMOLE 25; 200 MG/1; MG/1
1 CAPSULE, EXTENDED RELEASE ORAL
Qty: 0 | Refills: 0 | Status: DISCONTINUED | OUTPATIENT
Start: 2017-04-14 | End: 2017-04-24

## 2017-04-14 RX ORDER — SODIUM CHLORIDE 9 MG/ML
1000 INJECTION INTRAMUSCULAR; INTRAVENOUS; SUBCUTANEOUS
Qty: 0 | Refills: 0 | Status: DISCONTINUED | OUTPATIENT
Start: 2017-04-14 | End: 2017-04-16

## 2017-04-14 RX ORDER — SOD SULF/SODIUM/NAHCO3/KCL/PEG
4000 SOLUTION, RECONSTITUTED, ORAL ORAL ONCE
Qty: 0 | Refills: 0 | Status: COMPLETED | OUTPATIENT
Start: 2017-04-14 | End: 2017-04-14

## 2017-04-14 RX ORDER — INSULIN LISPRO 100/ML
VIAL (ML) SUBCUTANEOUS AT BEDTIME
Qty: 0 | Refills: 0 | Status: DISCONTINUED | OUTPATIENT
Start: 2017-04-14 | End: 2017-04-14

## 2017-04-14 RX ORDER — POLYETHYLENE GLYCOL 3350 17 G/17G
17 POWDER, FOR SOLUTION ORAL DAILY
Qty: 0 | Refills: 0 | Status: DISCONTINUED | OUTPATIENT
Start: 2017-04-14 | End: 2017-04-24

## 2017-04-14 RX ORDER — OXYBUTYNIN CHLORIDE 5 MG
10 TABLET ORAL
Qty: 0 | Refills: 0 | Status: DISCONTINUED | OUTPATIENT
Start: 2017-04-14 | End: 2017-04-15

## 2017-04-14 RX ORDER — METRONIDAZOLE 500 MG
500 TABLET ORAL ONCE
Qty: 0 | Refills: 0 | Status: COMPLETED | OUTPATIENT
Start: 2017-04-14 | End: 2017-04-14

## 2017-04-14 RX ORDER — LACTULOSE 10 G/15ML
15 SOLUTION ORAL ONCE
Qty: 0 | Refills: 0 | Status: DISCONTINUED | OUTPATIENT
Start: 2017-04-14 | End: 2017-04-14

## 2017-04-14 RX ORDER — METRONIDAZOLE 500 MG
500 TABLET ORAL EVERY 8 HOURS
Qty: 0 | Refills: 0 | Status: DISCONTINUED | OUTPATIENT
Start: 2017-04-14 | End: 2017-04-15

## 2017-04-14 RX ORDER — INSULIN LISPRO 100/ML
VIAL (ML) SUBCUTANEOUS
Qty: 0 | Refills: 0 | Status: DISCONTINUED | OUTPATIENT
Start: 2017-04-14 | End: 2017-04-14

## 2017-04-14 RX ORDER — INSULIN LISPRO 100/ML
3 VIAL (ML) SUBCUTANEOUS
Qty: 0 | Refills: 0 | Status: DISCONTINUED | OUTPATIENT
Start: 2017-04-14 | End: 2017-04-16

## 2017-04-14 RX ORDER — CIPROFLOXACIN LACTATE 400MG/40ML
400 VIAL (ML) INTRAVENOUS EVERY 12 HOURS
Qty: 0 | Refills: 0 | Status: DISCONTINUED | OUTPATIENT
Start: 2017-04-14 | End: 2017-04-15

## 2017-04-14 RX ORDER — MINERAL OIL
133 OIL (ML) MISCELLANEOUS
Qty: 0 | Refills: 0 | Status: DISCONTINUED | OUTPATIENT
Start: 2017-04-14 | End: 2017-04-14

## 2017-04-14 RX ORDER — INSULIN GLARGINE 100 [IU]/ML
14 INJECTION, SOLUTION SUBCUTANEOUS AT BEDTIME
Qty: 0 | Refills: 0 | Status: DISCONTINUED | OUTPATIENT
Start: 2017-04-14 | End: 2017-04-15

## 2017-04-14 RX ORDER — CIPROFLOXACIN LACTATE 400MG/40ML
VIAL (ML) INTRAVENOUS
Qty: 0 | Refills: 0 | Status: DISCONTINUED | OUTPATIENT
Start: 2017-04-14 | End: 2017-04-15

## 2017-04-14 RX ORDER — INSULIN LISPRO 100/ML
VIAL (ML) SUBCUTANEOUS
Qty: 0 | Refills: 0 | Status: DISCONTINUED | OUTPATIENT
Start: 2017-04-14 | End: 2017-04-18

## 2017-04-14 RX ORDER — METRONIDAZOLE 500 MG
TABLET ORAL
Qty: 0 | Refills: 0 | Status: DISCONTINUED | OUTPATIENT
Start: 2017-04-14 | End: 2017-04-15

## 2017-04-14 RX ORDER — INSULIN LISPRO 100/ML
VIAL (ML) SUBCUTANEOUS
Qty: 0 | Refills: 0 | Status: DISCONTINUED | OUTPATIENT
Start: 2017-04-14 | End: 2017-04-24

## 2017-04-14 RX ORDER — DIGOXIN 250 MCG
100 TABLET ORAL DAILY
Qty: 0 | Refills: 0 | Status: DISCONTINUED | OUTPATIENT
Start: 2017-04-14 | End: 2017-04-19

## 2017-04-14 RX ORDER — MINERAL OIL
133 OIL (ML) MISCELLANEOUS
Qty: 0 | Refills: 0 | Status: COMPLETED | OUTPATIENT
Start: 2017-04-14 | End: 2017-04-15

## 2017-04-14 RX ORDER — CAPTOPRIL 12.5 MG/1
12.5 TABLET ORAL
Qty: 0 | Refills: 0 | Status: DISCONTINUED | OUTPATIENT
Start: 2017-04-14 | End: 2017-04-15

## 2017-04-14 RX ORDER — INSULIN LISPRO 100/ML
3 VIAL (ML) SUBCUTANEOUS
Qty: 0 | Refills: 0 | Status: DISCONTINUED | OUTPATIENT
Start: 2017-04-14 | End: 2017-04-14

## 2017-04-14 RX ADMIN — Medication 3 UNIT(S): at 18:12

## 2017-04-14 RX ADMIN — SODIUM CHLORIDE 75 MILLILITER(S): 9 INJECTION, SOLUTION INTRAVENOUS at 09:27

## 2017-04-14 RX ADMIN — Medication 133 MILLILITER(S): at 13:30

## 2017-04-14 RX ADMIN — CAPTOPRIL 12.5 MILLIGRAM(S): 12.5 TABLET ORAL at 17:31

## 2017-04-14 RX ADMIN — SENNA PLUS 2 TABLET(S): 8.6 TABLET ORAL at 21:22

## 2017-04-14 RX ADMIN — Medication 1: at 21:21

## 2017-04-14 RX ADMIN — Medication 2: at 14:04

## 2017-04-14 RX ADMIN — Medication 1: at 10:14

## 2017-04-14 RX ADMIN — Medication 100 MILLIGRAM(S): at 01:23

## 2017-04-14 RX ADMIN — Medication 100 MILLIGRAM(S): at 09:27

## 2017-04-14 RX ADMIN — HEPARIN SODIUM 5000 UNIT(S): 5000 INJECTION INTRAVENOUS; SUBCUTANEOUS at 13:30

## 2017-04-14 RX ADMIN — Medication 100 MILLIGRAM(S): at 17:34

## 2017-04-14 RX ADMIN — Medication 200 MILLIGRAM(S): at 01:32

## 2017-04-14 RX ADMIN — Medication 100 MILLIGRAM(S): at 17:30

## 2017-04-14 RX ADMIN — Medication 10 MILLIGRAM(S): at 01:23

## 2017-04-14 RX ADMIN — HEPARIN SODIUM 5000 UNIT(S): 5000 INJECTION INTRAVENOUS; SUBCUTANEOUS at 21:22

## 2017-04-14 RX ADMIN — Medication 200 MILLIGRAM(S): at 17:30

## 2017-04-14 RX ADMIN — POLYETHYLENE GLYCOL 3350 17 GRAM(S): 17 POWDER, FOR SOLUTION ORAL at 17:34

## 2017-04-14 RX ADMIN — Medication 10 MILLIGRAM(S): at 17:31

## 2017-04-14 RX ADMIN — Medication 2: at 18:12

## 2017-04-14 RX ADMIN — INSULIN GLARGINE 14 UNIT(S): 100 INJECTION, SOLUTION SUBCUTANEOUS at 21:59

## 2017-04-14 RX ADMIN — Medication 100 MICROGRAM(S): at 09:35

## 2017-04-14 RX ADMIN — Medication 4000 MILLILITER(S): at 18:13

## 2017-04-14 RX ADMIN — SODIUM CHLORIDE 75 MILLILITER(S): 9 INJECTION, SOLUTION INTRAVENOUS at 06:33

## 2017-04-14 RX ADMIN — HEPARIN SODIUM 5000 UNIT(S): 5000 INJECTION INTRAVENOUS; SUBCUTANEOUS at 06:34

## 2017-04-14 RX ADMIN — Medication 133 MILLILITER(S): at 09:26

## 2017-04-14 RX ADMIN — SODIUM CHLORIDE 75 MILLILITER(S): 9 INJECTION INTRAMUSCULAR; INTRAVENOUS; SUBCUTANEOUS at 21:59

## 2017-04-14 NOTE — PROVIDER CONTACT NOTE (OTHER) - ASSESSMENT
Pt with 9 beats total of AIVR. /52, HR 76, T 98, RR 18, SpO2 98% (room air). Pt was sleeping comfortably at time of event. Easily arousabole, asymptomatic at this time. Pt on tele monitoring

## 2017-04-14 NOTE — DIETITIAN INITIAL EVALUATION ADULT. - PROBLEM SELECTOR PLAN 2
Patient with abdominal pain and stool impaction, failed manual disimpaction in the ED, with significant leukocytosis and lactate  -will treat with stool bowel regimen - surgery consulted: at this time, no acute surgical intervention, treat with bowel regimen  -f/u BM, will likely need GI consult in AM if not improving BM (pt has history of neostigmine usage)

## 2017-04-14 NOTE — SWALLOW BEDSIDE ASSESSMENT ADULT - COMMENTS
Per attending statement, pt has been more lethargic and less responsive of the last 2 days. In ED, HR 70-80, /70, Tmax 98.7, satting well on RA (was briefly on NC for unclear reasons, but trialed off and satting well). Pt is rousable and appropriately responsive. AOx2 (oriented to person and place, unable to tell me date). Denies pain, states that she is comfortable. Abdominal exam is distended and tympanic, but otherwise benign with normal bowel sounds and no TTP. Labs notable for leukocytosis to 19.9, Hg of 8.8 (stable), MIGUEL to 1.97 (BL 0.9), albumin of 2.3, INR 1.26, lactate initially 4.1, improved to 1.1 with fluids. CT A/P with severe constipation, also with small bilteral pleural effusions and mild atalectasis bilaterally. A/P Severe constipation likely 2/2 strictures from prior abdominal surgeries. Suspect that lethargy and leukocytosis are both 2/2 this, with possible stercoral ulceration. No other clear sources for infection, vital signs and exam benign, UA without signs of infection, CT A/P also without any clear inflammation. Cx pending. Doubt CNS infection given my exam. Given constipation/concerns for colitis, will treat for GI source with cipro flagyl for now. Rigorous bowel regimen with senna/colace, dulcolax suppositories. Advance as needed to mag citrate/lactulose. MIGUEL likely 2/2 poor POs, though should rule out-post renal 2/2 constipation obstruction, will send bilateral renal U/S, continue D5 half NS drip. Suspect that encephalopathy is 2/2 pain and also dehydration, patient is high risk for encephalopathy given poor substrate with multiple strokes, hydrocephalus and depression. Neuro c/s given  shunt and possible contribution of hydrocephalus. Will complete encephalitis w/u with TSH, B12, folate. HC: VPShunt: originating from the right lateral ventricle and ending in the right hemiabdomen without kinks or discontinuity. CXR: (-); XRAbd: Large amount of stool in the rectum and sigmoid colon, similar in appearance of the 4/13/2017 CT.

## 2017-04-14 NOTE — DISCHARGE NOTE ADULT - HOSPITAL COURSE
70 yo M with history of dementia, DM1 on insulin pump, hx of multiple CVA (bilateral LE weakness and dysarthria), HFrEF s/p AICD, depression, hx of volvulus hx w/ sigmoid resection on 3/2013, hx of RLE osteomyelitis (wound vac changes M/W/F) finished about presents for abdominal pain x few days.  is providing history via telephone as patient is currently altered and history of dementia.  reports that over past few days, his wife had started reporting that her stomach has been bothering her over the past few days. She had become distended at that time and has not been eating over the last several days. She reports that she had an episode of NBNV vomiting. He reports that she has been having a BM every day, with abut two episodes. He changes the bowel regimen depending on how her BM are that day. Last time he gave her any bowel stimulant was a couple days ago with Senna. He reports that at time he used imodium, said did not use it for a few days, but uses it depending on her BM.     At baseline, patient is minimal ambulatory, bound to wheelchair.     Patient has had a history of constipation. In the past, patient has required neostigmine with GI.  reports he had a sphincterotomy in past around 2015.     Patient was admitted to general medicine for further management. Colorectal surgery and GI were consulted and followed the patient's care during hospitalization. Mineral enemas and saline enemas were administered moderate stool output. A bedside speech and swallow evaluation was performed for which patient failed. Pt's spouse declined NGT to administer medication and understood the risks associated with aspiration including developing aspiration pneumonitis and death. 72 yo F with history of dementia, DM1 on insulin pump at home, hx of multiple CVA (bilateral LE weakness and dysarthria), HFrEF s/p AICD 2004, depression, hx of volvulus hx w/ sigmoid resection on 3/2013, hx sphincterotomy, hx of RLE osteomyelitis (wound vac changes M/W/F) finished about presents for abdominal pain x few days.  had provided history via telephone as patient was altered on admission and has history of dementia.  reported that over past few days, his wife had started reporting that her stomach has been bothering her over the past few days. She had become distended at that time and has not been eating over the last several days. She reports that she had an episode of NBNV vomiting. He reports that she has been having a BM every day, with abut two episodes. He changes the bowel regimen depending on how her BM are that day. Last time he gave her any bowel stimulant was a couple days prior to admission (Senna). He reports that prior to admission he used imodium, said did not use it for a few days, but uses it depending on her BM.     At baseline, patient is minimally ambulatory, bound to wheelchair.     Patient has had a history of constipation. In the past, patient has required neostigmine with GI.  reports he had a sphincterotomy in past around 2015.     Patient was admitted to general medicine for further management. Colorectal surgery and GI were consulted and followed the patient's care during hospitalization. Mineral enemas and saline enemas were administered moderate stool output. A bedside speech and swallow evaluation was performed for which patient failed. Pt's spouse declined NGT to administer medication and understood the risks associated with aspiration including developing aspiration pneumonitis and death.     Patient had started to have multiple bowel movements with improvement in abdominal distension with enema 4x daily and aggressive bowel regimen. CRC did not feel she urgently needed surgery. GI continued to follow.     Pt's blood cultures from 4/13 one of two bottles grew MRSA. CRISTIN was checked as there was concern for endocarditis and it showed Left atrial enlargement. No left atrial or left atrial appendage thrombus.   Normal left atrial appendage function (velocity= 0.7 m/s). Normal left ventricular internal dimensions and wall thicknesses. Severe global left ventricular systolic dysfunction. Septal motion is consistent with RV pacing. Right atrial enlargement.  A device wire is noted in the right heart. There appears to be a mobile echodensity approximately 1.8 cm in length on the atrial side of the right ventricular lead most likely consistent with endocarditis. Normal right ventricular size and function. Normal tricuspid valve. Mild tricuspid regurgitation.  Agitated saline injection and color flow Doppler demonstrate no evidence of a patent foramen ovale.    Vancomycin was started when first culture was positive and was continued. Repeat cx 4/16 were negative. 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  had provided history via telephone as patient was altered on admission and has history of dementia.  reported that over past few days, his wife had started reporting that her stomach has been bothering her over the past few days. She had become distended at that time and has not been eating over the last several days. She reports that she had an episode of NBNV vomiting. He reports that she has been having a BM every day, with abut two episodes. He changes the bowel regimen depending on how her BM are that day. Last time he gave her any bowel stimulant was a couple days prior to admission (Senna). He reports that prior to admission he used imodium, said did not use it for a few days, but uses it depending on her BM.     At baseline, patient is minimally ambulatory, bound to wheelchair. Patient has had a history of constipation. In the past, patient has required neostigmine with GI.  reports he had a sphincterotomy in past around 2015.     Patient was admitted to general medicine for further management. Colorectal surgery and GI were consulted and followed the patient's care during hospitalization. Mineral enemas and saline enemas were administered moderate stool output. A bedside speech and swallow evaluation was performed for which patient failed. Pt's spouse declined NGT to administer medication and understood the risks associated with aspiration including developing aspiration pneumonitis and death. He also wished for pt's diet to be liberalized to soft diet despite risks.     Patient had started to have multiple bowel movements with improvement in abdominal distension with enema 4x daily and aggressive bowel regimen. CRC did not feel she urgently needed surgery. GI continued to follow.     Pt's blood cultures from 4/13 one of two bottles grew MRSA. Vancomycin was started when first culture was positive and was continued. Repeat cx 4/16 were negative. CRISTIN showed mobile echodensity approximately 1.8 cm in length on the atrial side of the right ventricular lead most likely consistent with endocarditis. Infectious Disease was consulted and recommended removal of AICD given pt had failed a course of IV vancomycin prior to admission. EP and CTSx were consulted and attempted lead extraction; however due to technical difficulties and high calcifications, was unable to remove ACID leads. They removed pacemaker generator.     Upon discussion with , he expressed that his goal for Mrs. Serna was comfort care, DNR/DNI, and go to home. 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  had provided history via telephone as patient was altered on admission and has history of dementia.  reported that over past few days, his wife had started reporting that her stomach has been bothering her over the past few days. She had become distended at that time and has not been eating over the last several days. She reports that she had an episode of NBNV vomiting. He reports that she has been having a BM every day, with abut two episodes. He changes the bowel regimen depending on how her BM are that day. Last time he gave her any bowel stimulant was a couple days prior to admission (Senna). He reports that prior to admission he used imodium, said did not use it for a few days, but uses it depending on her BM.     At baseline, patient is minimally ambulatory, bound to wheelchair. Patient has had a history of constipation. In the past, patient has required neostigmine with GI.  reports he had a sphincterotomy in past around 2015.     Patient was admitted to general medicine for further management. Colorectal surgery and GI were consulted and followed the patient's care during hospitalization. Mineral enemas and saline enemas were administered moderate stool output. A bedside speech and swallow evaluation was performed for which patient failed. Pt's spouse declined NGT to administer medication and understood the risks associated with aspiration including developing aspiration pneumonitis and death. He also wished for pt's diet to be liberalized to soft diet despite risks.     Patient had started to have multiple bowel movements with improvement in abdominal distension with enema 4x daily and aggressive bowel regimen. CRC did not feel she urgently needed surgery. GI continued to follow.     Pt's blood cultures from 4/13 one of two bottles grew MRSA. Vancomycin was started when first culture was positive and was continued. Repeat cx 4/16 were negative. CRISTIN showed mobile echodensity approximately 1.8 cm in length on the atrial side of the right ventricular lead most likely consistent with endocarditis. Infectious Disease was consulted and recommended removal of AICD given pt had failed a course of IV vancomycin prior to admission. EP and CTSx were consulted and attempted lead extraction; however due to technical difficulties and high calcifications, was unable to remove ACID leads. They removed pacemaker generator. Per ID, further IV abx likely suboptimal in the setting of ICD with vegetation.    Upon discussion with , he expressed that his goal for Mrs. Serna was comfort care, DNR/DNI, and go to home. Pt's  expressed the burden of IV abx when unable to remove source. Palliative Care and Hospice consulted- pt's  elected for home hospice without further IV abx. Discussed with Endocrinology, plan for discharge on insulin pump. 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  had provided history via telephone as patient was altered on admission and has history of dementia.  reported that over past few days, his wife had started reporting that her stomach has been bothering her over the past few days. She had become distended at that time and has not been eating over the last several days. She reports that she had an episode of NBNV vomiting. He reports that she has been having a BM every day, with abut two episodes. He changes the bowel regimen depending on how her BM are that day. Last time he gave her any bowel stimulant was a couple days prior to admission (Senna). He reports that prior to admission he used imodium, said did not use it for a few days, but uses it depending on her BM.     At baseline, patient is minimally ambulatory, bound to wheelchair. Patient has had a history of constipation. In the past, patient has required neostigmine with GI.  reports he had a sphincterotomy in past around 2015.     Patient was admitted to general medicine for further management. Colorectal surgery and GI were consulted and followed the patient's care during hospitalization. Mineral enemas and saline enemas were administered moderate stool output. A bedside speech and swallow evaluation was performed for which patient failed. Pt's spouse declined NGT to administer medication and understood the risks associated with aspiration including developing aspiration pneumonitis and death. He also wished for pt's diet to be liberalized to soft diet despite risks.     Patient had started to have multiple bowel movements with improvement in abdominal distension with enema 4x daily and aggressive bowel regimen. CRC did not feel she urgently needed surgery. GI continued to follow.     Pt's blood cultures from 4/13 one of two bottles grew MRSA. Vancomycin was started when first culture was positive and was continued. Repeat cx 4/16 were negative. CRISTIN showed mobile echodensity approximately 1.8 cm in length on the atrial side of the right ventricular lead most likely consistent with endocarditis. Infectious Disease was consulted and recommended removal of AICD given pt had failed a course of IV vancomycin prior to admission. EP and CTSx were consulted and attempted lead extraction; however due to technical difficulties and high calcifications, was unable to remove ACID leads. They removed pacemaker generator. Per ID, further IV abx likely suboptimal in the setting of ICD with vegetation.    Upon discussion with , he expressed that his goal for Mrs. Serna was comfort care, DNR/DNI, and go to home. Pt's  expressed the burden of IV abx when unable to remove source. Palliative Care and Hospice consulted- pt's  elected for home hospice without further IV abx. Discussed with Endocrinology, plan for discharge on insulin pump. Prior to discharge patient noted to have left upper extremity swelling after patient made comfort care with discharge to home hospice. Plan to investigate and treat depending on whether DVT or infection was discussed with patient's . Given that he elected to keep her comfortable he did not want to pursue ultrasound of the left upper extremity. His goals were to keep the patient comfortable and not prolong her hospital stay any longer and bring her home. 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  had provided history via telephone as patient was altered on admission and has history of dementia.  reported that over past few days, his wife had started reporting that her stomach has been bothering her over the past few days. She had become distended at that time and has not been eating over the last several days. She reports that she had an episode of NBNV vomiting. He reports that she has been having a BM every day, with abut two episodes. He changes the bowel regimen depending on how her BM are that day. Last time he gave her any bowel stimulant was a couple days prior to admission (Senna). He reports that prior to admission he used imodium, said did not use it for a few days, but uses it depending on her BM.     At baseline, patient is minimally ambulatory, bound to wheelchair. Patient has had a history of constipation. In the past, patient has required neostigmine with GI.  reports he had a sphincterotomy in past around 2015.     Patient was admitted to general medicine for further management. Colorectal surgery and GI were consulted and followed the patient's care during hospitalization. Mineral enemas and saline enemas were administered moderate stool output. A bedside speech and swallow evaluation was performed for which patient failed. Pt's spouse declined NGT to administer medication and understood the risks associated with aspiration including developing aspiration pneumonitis and death. He also wished for pt's diet to be liberalized to soft diet despite risks.     Patient had started to have multiple bowel movements with improvement in abdominal distension with enema 4x daily and aggressive bowel regimen. CRC did not feel she urgently needed surgery. GI continued to follow.     Pt's blood cultures from 4/13 one of two bottles grew MRSA. Vancomycin was started when first culture was positive and was continued. Repeat cx 4/16 were negative. CRISTIN showed mobile echodensity approximately 1.8 cm in length on the atrial side of the right ventricular lead most likely consistent with endocarditis. Infectious Disease was consulted and recommended removal of AICD given pt had failed a course of IV vancomycin prior to admission. EP and CTSx were consulted and attempted lead extraction; however due to technical difficulties and high calcifications, was unable to remove ACID leads. They removed pacemaker generator. Per ID, further IV abx likely suboptimal in the setting of ICD with vegetation.    Upon discussion with , he expressed that his goal for Mrs. Serna was comfort care, DNR/DNI, and go to home. Pt's  expressed the burden of IV abx when unable to remove source. Palliative Care and Hospice consulted- pt's  elected for home hospice without further IV abx. Discussed with Endocrinology, plan for discharge on insulin pump. Prior to discharge patient noted to have left upper extremity swelling after patient made comfort care with discharge to home hospice. Plan to investigate and treat depending on whether DVT or infection was discussed with patient's . Given that he elected to keep her comfortable he did not want to pursue ultrasound of the left upper extremity. His goals were to keep the patient comfortable and not prolong her hospital stay any longer and bring her home. Pt also had evaluation of RLE ankle wound on lateral side by wound care, wound care pt, and ortho. Pt determined not to need wound vac which was used outpatient. Pt also not in need of brace and can bear weight as tolerated per ortho. 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  had provided history via telephone as patient was altered on admission and has history of dementia.  reported that over past few days, his wife had started reporting that her stomach has been bothering her over the past few days. She had become distended at that time and has not been eating over the last several days. She reports that she had an episode of NBNV vomiting. He reports that she has been having a BM every day, with abut two episodes. He changes the bowel regimen depending on how her BM are that day. Last time he gave her any bowel stimulant was a couple days prior to admission (Senna). He reports that prior to admission he used imodium, said did not use it for a few days, but uses it depending on her BM.     At baseline, patient is minimally ambulatory, bound to wheelchair. Patient has had a history of constipation. In the past, patient has required neostigmine with GI.  reports he had a sphincterotomy in past around 2015.     Patient was admitted to general medicine for further management. Colorectal surgery and GI were consulted and followed the patient's care during hospitalization. Mineral enemas and saline enemas were administered moderate stool output. A bedside speech and swallow evaluation was performed for which patient failed. Pt's spouse declined NGT to administer medication and understood the risks associated with aspiration including developing aspiration pneumonitis and death. He also wished for pt's diet to be liberalized to soft diet despite risks.     Patient had started to have multiple bowel movements with improvement in abdominal distension with enema 4x daily and aggressive bowel regimen. CRC did not feel she urgently needed surgery. GI continued to follow.     Pt's blood cultures from 4/13 one of two bottles grew MRSA. Vancomycin was started when first culture was positive and was continued. Repeat cx 4/16 were negative. CRISTIN showed mobile echodensity approximately 1.8 cm in length on the atrial side of the right ventricular lead most likely consistent with endocarditis. Infectious Disease was consulted and recommended removal of AICD given pt had failed a course of IV vancomycin prior to admission. EP and CTSx were consulted and attempted lead extraction; however due to technical difficulties and high calcifications, was unable to remove ACID leads. They removed pacemaker generator. Per ID, further IV abx likely suboptimal in the setting of ICD with vegetation.    Upon discussion with , he expressed that his goal for Mrs. Serna was comfort care, DNR/DNI, and go to home. Pt's  expressed the burden of IV abx when unable to remove source. Palliative Care and Hospice consulted- pt's  elected for home hospice without further IV abx. Discussed with Endocrinology, plan for discharge on insulin pump. Prior to discharge patient noted to have left upper extremity swelling after patient made comfort care with discharge to home hospice. Plan to investigate and treat depending on whether DVT or infection was discussed with patient's . Given that he elected to keep her comfortable he did not want to pursue ultrasound of the left upper extremity. His goals were to keep the patient comfortable and not prolong her hospital stay any longer and bring her home. Pt also had evaluation of RLE ankle wound on lateral side by wound care, wound care pt, and ortho. Pt determined not to need wound vac which was used outpatient. Pt also not in need of brace and can bear weight as tolerated per ortho.     The jones was removed before discharge and after 7 hours pt had not urinated and bladder scan showed 237 mL. Pt's  was informed of the risks of leaving without a jones however  and HCP elected to go home and was informed of the risks of kidney damage with urinary retention. He was asked to follow up with the hospice nurse if the patient did not urinate.

## 2017-04-14 NOTE — SWALLOW BEDSIDE ASSESSMENT ADULT - SLP GENERAL OBSERVATIONS
Pt supine in bed, tearful, emotionally labile. Difficult to redirect at times, [facility]. Able to follow commands. + Dysarthria, slow rate of speech

## 2017-04-14 NOTE — SWALLOW BEDSIDE ASSESSMENT ADULT - DIET PRIOR TO ADMI
Cut up solids and thin fluids in a chin tuck; however, spouse reports pt "sometimes" uses chin tuck posture

## 2017-04-14 NOTE — DIETITIAN INITIAL EVALUATION ADULT. - ADHERENCE
fair/Pt's  denies adding salt to foods when cooking. Pt monitors CHO intake. Pt on insulin pump PTA. Blood sugars are checked 5x/day and usually range 100-300.

## 2017-04-14 NOTE — SWALLOW BEDSIDE ASSESSMENT ADULT - SWALLOW EVAL: DIAGNOSIS
Pt with fecal impaction s/p manual disimpaction and multiple enemas as part of bowel regime awaiting further stool release prior to initiating PO diet. Cleared for assessment of tolerance of fluids by MD Enriquez as colorectal surgery approved pt for sips of fluids. Given ice chip and sip of thin fluid, pt presents with anterior loss and a delay in the trigger of the pharyngeal swallow with overt s/s of laryngeal penetration and/or aspiration. Chin tuck posture was not independently assumed and despite verbal and tactile cues pt continued to maintain head midline. No overt s/s of laryngeal penetration or aspiration noted with nectar thickened fluid. No solids trialed given persistent fecal impaction. Mild-moderate dysarthria.

## 2017-04-14 NOTE — SWALLOW BEDSIDE ASSESSMENT ADULT - SWALLOW EVAL: PATIENT/FAMILY GOALS STATEMENT
Pt's  reports occasional swallowing difficulty, "like everyone." When probed further, spouse reports pt with MBS "several years ago" at [facility] with recommendation for chin tuck with fluid. Reports refusing to eat pureed foods that were recommended "somewhere, years ago." Endorses that pt "does fine" when the food is cut up." However the only MBS noted in EMR was from 1/8/16 at [facility] with recommendation for Dysphagia 1 with nectar thickened fluid.

## 2017-04-14 NOTE — PROVIDER CONTACT NOTE (OTHER) - ASSESSMENT
Pt with 10 beats of WCT, . /56 HR 83 RR 20 T 98 SpO2 98% room air. Pt was crying at time of episode. Pt education provided. Pt denies CP, shortness of breath, and palpitations. Relaxation techniques promoted, emotional support provided.

## 2017-04-14 NOTE — ADVANCED PRACTICE NURSE CONSULT - RECOMMEDATIONS
Will recommend the followin. Right lateral malleolus: Cavilon to the periwound skin; Medihoney paste to the wound - cover with gauze and secure with moni- change daily and prn for drainage  2. consider Podiatry/Vascular consults for further evaluation  3. z-paul boots for off-loading  4. nutrition support as pt condition allows  5. continue with turning and positioning.  Tx plan discussed with RN- remain available as requested by staff

## 2017-04-14 NOTE — ADVANCED PRACTICE NURSE CONSULT - REASON FOR CONSULT
Requested by staff to assess skin status: pt a/w a foot wound. PMH is noted:  72 yo F with history of dementia, DM1 on insulin pump, hx of multiple CVA (bilateral LE weakness and dysarthria), HFrEF s/p AICD, depression, hx of volvulus hx w/ sigmoid resection on 3/2013, hx of RLE osteomyelitis (wound vac changes M/W/F) finished about presents for abdominal pain x few days.  is providing history via telephone as patient is currently altered and history of dementia.  reports that over past few days, his wife had started reporting that her stomach has been bothering her over the past few days. She had become distended at that time and has not been eating over the last several days. She reports that she had an episode of NBNV vomiting. He reports that she has been having a BM every day, with abut two episodes. He changes the bowel regimen depending on how her BM are that day. Last time he gave her any bowel stimulant was a couple days ago with Senna. He reports that at time he used imodium, said did not use it for a few days, but uses it depending on her BM.     At baseline, patient is minimal ambulatory, bound to wheelchair.     Patient has had a history of constipation. In the past, patient has required neostigmine with GI.  reports he had a sphincterotomy in past around .   Past Medical History:  Arrhythmia    Carpal Tunnel Syndrome    Cerebral Vascular Accident    Congestive Heart Failure    High Cholesterol    Hydrocephalus    Hypertension    Juvenile Diabetes Mellitus    Peripheral Neuropathy.    Past Surgical History:  AICD (Automatic Cardioverter/Defibrillator) Present     delivery NOS    H/O colectomy    History of .

## 2017-04-14 NOTE — DISCHARGE NOTE ADULT - PLAN OF CARE
Comfort care This hospitalization you were found to have MRSA or methicillin resistant staph aureus bacteria in the blood, this prompted acquisition of transesophageal echocardiogram which showed a vegetation (presence of bacteria) on a lead of your ICD. Electrophysiology and cardiothoracic surgery worked together to try and have the ICD removed and infectious disease followed your progress recommending vancomycin for treatment. Unfortunately, the lead could not be removed. The lead placed in 2004 had fibrosis that made it difficult to remove. Based on your wishes and your 's wishes you met with the palliative care team and the decision was made to discharge you home with home hospice and not pursue vancomycin via PICC line and life long oral antibiotic therapy as this would severely impact your quality of life and your family's quality of life given that antibiotics alone would not cure the infection; only removal of the ICD leads would. You were admitted for abdominal pain and were found to have severe constipation. Mineral and water enemas as well as oral laxatives were used to help you move your bowels with improvement in your abdominal pain and distension. You should continue to use these medications to prevent this from happening again. During this admission your kidneys were injured possibly from the large stool burden in your colon compressing on ureters. A jones catheter was placed and your kidney function improved. Please continue using the laxatives and enemas to prevent this from happening again. You have a history of CHF; you remained euvolemic with no signs of acute exacerbation of your heart failure. Please continue taking captopril, lopressor, lasix as directed. Dr. Arroyo has been helping manage your diabetes inpatient and outpatient; please use the insulin pump on discharge as directed by Dr. Arroyo. Please follow up with him on discharge. Your right lower extremity ankle wound was treated with a wound vac before you came to the hospital. You were evaluated by wound care and ortho and not determined to need a brace. The wound vac will not be necessary as your care will focus on comfort and should continue with local care with medihoney and payton. During this admission your kidneys were injured possibly from the large stool burden in your colon compressing on ureters. A jones catheter was placed and your kidney function improved. Please continue using the laxatives and enemas to prevent this from happening again.  The jones was removed before discharge and after 7 hours you had not urinated and bladder scan showed 237 mL. You were informed of the risks of leaving without a jones however your  and HCP elected to go home and was informed of the risks of kidney damage with urinary retention. He was asked to follow up with the hospice nurse if the patient did not urinate.

## 2017-04-14 NOTE — DIETITIAN INITIAL EVALUATION ADULT. - PROBLEM SELECTOR PLAN 8
Discussed with  about advanced directive  - reports he has paper at home expressing DNR/DNI wishes, which he will bring to hospital  -patient DNR/DNI at this time

## 2017-04-14 NOTE — DIETITIAN INITIAL EVALUATION ADULT. - ENERGY NEEDS
ht.63inches wt.146.6 pounds BMI: 26kg/m2 IBW: 115 (+/-10%) %IBW: 127%  Pt is 72 yo F with hx of dementia, T1DM on insulin pump, HTN, hx of multiple CVA, HFrEF s/p AICD, hx of volvulus with sigmoid resection 3/2003, hx of RLW osteomylitis (wound vac M/W/F) finished, presents for abdominal pain x few days, found to have significant stool burden.  +1 left leg/ankle Edema and +2 right leg/ankle edema  Stage 2 sacrum Pressure Ulcer, diabetic foot ulcer right foot.

## 2017-04-14 NOTE — DIETITIAN INITIAL EVALUATION ADULT. - PROBLEM SELECTOR PLAN 4
Patient with MIGUEL, with previous Cr of 0.9, likely 2/2 pre-renal in setting of decreased PO intake  -gentle IVF at this time  -f/u BMP in AM  -avoid nephrotoxins, renally dose medications

## 2017-04-14 NOTE — DISCHARGE NOTE ADULT - SECONDARY DIAGNOSIS.
Abdominal pain MIGUEL (acute kidney injury) Congestive heart failure Diabetes mellitus type 1 Wound of right leg

## 2017-04-14 NOTE — DISCHARGE NOTE ADULT - MEDICATION SUMMARY - MEDICATIONS TO TAKE
I will START or STAY ON the medications listed below when I get home from the hospital:    insulin pump  --     -- Indication: For Diabetes mellitus type 1    acetaminophen 325 mg oral tablet  -- 2 tab(s) by mouth every 6 hours, As needed, Mild Pain  -- Indication: For Pain    captopril 12.5 mg oral tablet  -- 1 tab(s) by mouth 2 times a day  -- Indication: For Heart failure, chronic systolic    digoxin 125 mcg (0.125 mg) oral tablet  -- 1 tab(s) by mouth once a day  -- Indication: For Heart failure, chronic systolic    PARoxetine 40 mg oral tablet  -- 1 tab(s) by mouth once a day  -- Indication: For Depression    atorvastatin 80 mg oral tablet  -- 1 tab(s) by mouth once a day (at bedtime)  -- Indication: For History of CVA    aspirin-dipyridamole 25 mg-200 mg oral capsule, extended release  -- 1 cap(s) by mouth 2 times a day  -- Indication: For History of CVA    Lopressor 50 mg oral tablet  -- 1 tab(s) by mouth 2 times a day  -- Indication: For Heart failure, chronic systolic    Lasix 40 mg oral tablet  -- 1 tab(s) by mouth once a day  -- Indication: For Heart failure, chronic systolic    Dulcolax Laxative 5 mg oral delayed release tablet  -- 1 tab(s) by mouth every 12 hours, As needed, Constipation  -- Indication: For Constipation    Colace 100 mg oral capsule  -- 1 cap(s) by mouth 2 times a day  -- Indication: For Constipation    Fleet Mineral Oil rectal enema  -- 133 milliliter(s) rectally 2 times a day, As needed, Constipation  -- Indication: For Constipation

## 2017-04-14 NOTE — DISCHARGE NOTE ADULT - CARE PLAN
Principal Discharge DX:	Acute bacterial endocarditis  Goal:	Comfort care  Instructions for follow-up, activity and diet:	This hospitalization you were found to have MRSA or methicillin resistant staph aureus bacteria in the blood, this prompted acquisition of transesophageal echocardiogram which showed a vegetation (presence of bacteria) on a lead of your ICD. Electrophysiology and cardiothoracic surgery worked together to try and have the ICD removed and infectious disease followed your progress recommending vancomycin for treatment. Unfortunately, the lead could not be removed. The lead placed in 2004 had fibrosis that made it difficult to remove. Based on your wishes and your 's wishes you met with the palliative care team and the decision was made to discharge you home with home hospice and not pursue vancomycin via PICC line and life long oral antibiotic therapy as this would severely impact your quality of life and your family's quality of life given that antibiotics alone would not cure the infection; only removal of the ICD leads would.  Secondary Diagnosis:	Abdominal pain  Instructions for follow-up, activity and diet:	You were admitted for abdominal pain and were found to have severe constipation. Mineral and water enemas as well as oral laxatives were used to help you move your bowels with improvement in your abdominal pain and distension. You should continue to use these medications to prevent this from happening again.  Secondary Diagnosis:	MIGUEL (acute kidney injury)  Instructions for follow-up, activity and diet:	During this admission your kidneys were injured possibly from the large stool burden in your colon compressing on ureters. A jones catheter was placed and your kidney function improved. Please continue using the laxatives and enemas to prevent this from happening again.  Secondary Diagnosis:	Congestive heart failure  Instructions for follow-up, activity and diet:	You have a history of CHF; you remained euvolemic with no signs of acute exacerbation of your heart failure. Please continue taking captopril, lopressor, lasix as directed.  Secondary Diagnosis:	Diabetes mellitus type 1  Instructions for follow-up, activity and diet:	Dr. Arroyo has been helping manage your diabetes inpatient and outpatient; please use the insulin pump on discharge as directed by Dr. Arroyo. Please follow up with him on discharge. Principal Discharge DX:	Acute bacterial endocarditis  Goal:	Comfort care  Instructions for follow-up, activity and diet:	This hospitalization you were found to have MRSA or methicillin resistant staph aureus bacteria in the blood, this prompted acquisition of transesophageal echocardiogram which showed a vegetation (presence of bacteria) on a lead of your ICD. Electrophysiology and cardiothoracic surgery worked together to try and have the ICD removed and infectious disease followed your progress recommending vancomycin for treatment. Unfortunately, the lead could not be removed. The lead placed in 2004 had fibrosis that made it difficult to remove. Based on your wishes and your 's wishes you met with the palliative care team and the decision was made to discharge you home with home hospice and not pursue vancomycin via PICC line and life long oral antibiotic therapy as this would severely impact your quality of life and your family's quality of life given that antibiotics alone would not cure the infection; only removal of the ICD leads would.  Secondary Diagnosis:	Abdominal pain  Instructions for follow-up, activity and diet:	You were admitted for abdominal pain and were found to have severe constipation. Mineral and water enemas as well as oral laxatives were used to help you move your bowels with improvement in your abdominal pain and distension. You should continue to use these medications to prevent this from happening again.  Secondary Diagnosis:	MIGUEL (acute kidney injury)  Instructions for follow-up, activity and diet:	During this admission your kidneys were injured possibly from the large stool burden in your colon compressing on ureters. A jones catheter was placed and your kidney function improved. Please continue using the laxatives and enemas to prevent this from happening again.  Secondary Diagnosis:	Congestive heart failure  Instructions for follow-up, activity and diet:	You have a history of CHF; you remained euvolemic with no signs of acute exacerbation of your heart failure. Please continue taking captopril, lopressor, lasix as directed.  Secondary Diagnosis:	Diabetes mellitus type 1  Instructions for follow-up, activity and diet:	Dr. Arroyo has been helping manage your diabetes inpatient and outpatient; please use the insulin pump on discharge as directed by Dr. Arroyo. Please follow up with him on discharge.  Secondary Diagnosis:	Wound of right leg  Instructions for follow-up, activity and diet:	Your right lower extremity ankle wound was treated with a wound vac before you came to the hospital. You were evaluated by wound care and ortho and not determined to need a brace. The wound vac will not be necessary as your care will focus on comfort and should continue with local care with medihoney and payton. Principal Discharge DX:	Acute bacterial endocarditis  Goal:	Comfort care  Instructions for follow-up, activity and diet:	This hospitalization you were found to have MRSA or methicillin resistant staph aureus bacteria in the blood, this prompted acquisition of transesophageal echocardiogram which showed a vegetation (presence of bacteria) on a lead of your ICD. Electrophysiology and cardiothoracic surgery worked together to try and have the ICD removed and infectious disease followed your progress recommending vancomycin for treatment. Unfortunately, the lead could not be removed. The lead placed in 2004 had fibrosis that made it difficult to remove. Based on your wishes and your 's wishes you met with the palliative care team and the decision was made to discharge you home with home hospice and not pursue vancomycin via PICC line and life long oral antibiotic therapy as this would severely impact your quality of life and your family's quality of life given that antibiotics alone would not cure the infection; only removal of the ICD leads would.  Secondary Diagnosis:	Abdominal pain  Instructions for follow-up, activity and diet:	You were admitted for abdominal pain and were found to have severe constipation. Mineral and water enemas as well as oral laxatives were used to help you move your bowels with improvement in your abdominal pain and distension. You should continue to use these medications to prevent this from happening again.  Secondary Diagnosis:	MIGUEL (acute kidney injury)  Instructions for follow-up, activity and diet:	During this admission your kidneys were injured possibly from the large stool burden in your colon compressing on ureters. A jones catheter was placed and your kidney function improved. Please continue using the laxatives and enemas to prevent this from happening again.  The jones was removed before discharge and after 7 hours you had not urinated and bladder scan showed 237 mL. You were informed of the risks of leaving without a jones however your  and HCP elected to go home and was informed of the risks of kidney damage with urinary retention. He was asked to follow up with the hospice nurse if the patient did not urinate.  Secondary Diagnosis:	Congestive heart failure  Instructions for follow-up, activity and diet:	You have a history of CHF; you remained euvolemic with no signs of acute exacerbation of your heart failure. Please continue taking captopril, lopressor, lasix as directed.  Secondary Diagnosis:	Diabetes mellitus type 1  Instructions for follow-up, activity and diet:	Dr. Arroyo has been helping manage your diabetes inpatient and outpatient; please use the insulin pump on discharge as directed by Dr. Arroyo. Please follow up with him on discharge.  Secondary Diagnosis:	Wound of right leg  Instructions for follow-up, activity and diet:	Your right lower extremity ankle wound was treated with a wound vac before you came to the hospital. You were evaluated by wound care and ortho and not determined to need a brace. The wound vac will not be necessary as your care will focus on comfort and should continue with local care with medihoney and collaginase.

## 2017-04-14 NOTE — SWALLOW BEDSIDE ASSESSMENT ADULT - SWALLOW EVAL: RECOMMENDED FEEDING/EATING TECHNIQUES
crush medication (when feasible)/position upright (90 degrees)/maintain upright posture during/after eating for 30 mins

## 2017-04-14 NOTE — DISCHARGE NOTE ADULT - MEDICATION SUMMARY - MEDICATIONS TO STOP TAKING
I will STOP taking the medications listed below when I get home from the hospital:    loperamide  --  by mouth , As Needed    VESIcare 10 mg oral tablet  -- 1 tab(s) by mouth once a day (at bedtime)    Nuedexta 20 mg-10 mg oral capsule  -- 1 cap(s) by mouth once a day

## 2017-04-14 NOTE — DIETITIAN INITIAL EVALUATION ADULT. - PROBLEM SELECTOR PLAN 6
Patient with hx of depression and anxiety  -patient on paroxetine and Nuedexta ( reports patient had episode of crying)

## 2017-04-14 NOTE — SWALLOW BEDSIDE ASSESSMENT ADULT - PHARYNGEAL PHASE
Delayed pharyngeal swallow/Decreased laryngeal elevation/Cough post oral intake Delayed pharyngeal swallow/Decreased laryngeal elevation

## 2017-04-14 NOTE — SWALLOW BEDSIDE ASSESSMENT ADULT - SLP PERTINENT HISTORY OF CURRENT PROBLEM
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

## 2017-04-14 NOTE — DISCHARGE NOTE ADULT - CARE PROVIDER_API CALL
Ajay Arroyo), EndocrinologyMetabDiabetes; Internal Medicine  [address] [address]  Phone: [phone]  Fax: [phone] Ajay Arroyo), EndocrinologyMetabDiabetes; Internal Medicine  [address] [address]  Phone: [phone]  Fax: [phone]

## 2017-04-14 NOTE — DIETITIAN INITIAL EVALUATION ADULT. - OTHER INFO
Pt seen for chewing/swallowing difficulty and pressure ulcer consult. Pt is currently NPO, waiting for SLP evaluation. Pt's  reports UBW of 135 pounds, denied recent wt change, per chart pt current wt 146.6 pounds.  Pt had emesis PTA and persistent constipation. No emesis in house, pt with BM today on bowel regimen. Pt's  denied any nausea or diarrhea. Pt's  reports occasional swallowing difficulty, "like everyone." Pt seen for chewing/swallowing difficulty and stage 2 pressure ulcer consult. Pt is currently NPO, waiting for SLP evaluation. Pt's  reports UBW of 135 pounds, denied recent wt change, per chart pt current wt 146.6 pounds.  Pt had emesis PTA and persistent constipation. No emesis in house, pt with BM today on bowel regimen. Pt's  denied any nausea or diarrhea. Pt's  reports occasional swallowing difficulty, "like everyone."

## 2017-04-14 NOTE — ADVANCED PRACTICE NURSE CONSULT - ASSESSMENT
Pts  at bedside and able to provide hx of pts wound- reports that wound occurred as a result of a brace to the extremity has been present for months; last treatment was NPWT.   Pt abducts her foot to the right- there is a full-thickness wound on the right lateral malleolus which measures 6cm x 4cm x0.3cm - the wound is painful to touch there is pale pink granulation tissue and soft yellow slough, wound edges are macerated. the periwound skin is intact with no induration or fluctuance noted. difficult to palpate the pedal pulse.  Medihoney paste was applied to facilitate autolytic debridement and to provide antimicrobial activity. will also recommend Podiatry and Vascular consults for further evaluation.  The pt is on a low air-loss surface and is being turned and positioned as per the nursing documentation.  A nutrition consult is pending.

## 2017-04-14 NOTE — SWALLOW BEDSIDE ASSESSMENT ADULT - ADDITIONAL RECOMMENDATIONS
Maintain good oral hygiene.  This service will continue to follow. Of note, pt is not a candidate for MBS given GI condition and would not likely tolerate FEES.

## 2017-04-14 NOTE — DIETITIAN INITIAL EVALUATION ADULT. - PROBLEM SELECTOR PLAN 1
Patient with AMS, likely 2/2 stool impaction and colitis  -CT A/P: patient with stool impaction, with leukocytosis, concerning for stercoral colitis - will treat with suppository and enema as needed, will also treat with ciprofloxacin/metronidazole  -r/o structural: CT head with no structural changes, neurology consult with history of  shunt  -metabolic: Patient with hypoglycemia seen on VBG, with hx of decreased PO intake and on insulin - endocrinology c/s appreciated: will do lantus at this time with D5 IVF. Continue with monitoring FSG closesly

## 2017-04-14 NOTE — DIETITIAN INITIAL EVALUATION ADULT. - PROBLEM SELECTOR PLAN 7
Patient with hx of HF, although no echo seen in allscripts or sunrise  -currently hypovolemic, will treat with gentle IVF and continue to monitoring

## 2017-04-14 NOTE — DIETITIAN INITIAL EVALUATION ADULT. - NUTRITION INTERVENTION
Meals and Snack/Feeding Assistance/Vitamin/Nutrition Education Vitamin/Nutrition Education/Feeding Assistance/Meals and Snack/Medical Food Supplements

## 2017-04-14 NOTE — DIETITIAN INITIAL EVALUATION ADULT. - NS AS NUTRI INTERV MEALS SNACK
Carbohydrate - modified diet/Schedule of food/fluids/Defer food and fluid consistency to speech and swallow. Once medically appropriate, advance diet as tolerated to DASH consistent CHO

## 2017-04-14 NOTE — DIETITIAN INITIAL EVALUATION ADULT. - PROBLEM SELECTOR PLAN 3
Patient with tachypnea, leukocytosis with possible colitis,  -treat colitis with ciprofloxacin and metronidazole at this time,   -patient with no signs of UTI on UA or on exam, CXR pending but no clinical symptoms of PNA  -f/u blood cx, f/u urine cx, f/u CBC daily

## 2017-04-14 NOTE — DISCHARGE NOTE ADULT - PATIENT PORTAL LINK FT
“You can access the [facility] Patient Portal, offered by [facility], by registering with the following website: [email or web address]”

## 2017-04-15 LAB
AMPHET UR-MCNC: NEGATIVE — SIGNIFICANT CHANGE UP
ANION GAP SERPL CALC-SCNC: 13 MMOL/L — SIGNIFICANT CHANGE UP (ref 5–17)
BARBITURATES, URINE.: NEGATIVE — SIGNIFICANT CHANGE UP
BENZODIAZ UR-MCNC: NEGATIVE — SIGNIFICANT CHANGE UP
BLD GP AB SCN SERPL QL: NEGATIVE — SIGNIFICANT CHANGE UP
BUN SERPL-MCNC: 40 MG/DL — HIGH (ref 7–23)
CALCIUM SERPL-MCNC: 8.1 MG/DL — LOW (ref 8.4–10.5)
CHLORIDE SERPL-SCNC: 102 MMOL/L — SIGNIFICANT CHANGE UP (ref 96–108)
CO2 SERPL-SCNC: 21 MMOL/L — LOW (ref 22–31)
COCAINE METAB.OTHER UR-MCNC: NEGATIVE — SIGNIFICANT CHANGE UP
CREAT ?TM UR-MCNC: 81 MG/DL — SIGNIFICANT CHANGE UP
CREAT SERPL-MCNC: 1.56 MG/DL — HIGH (ref 0.5–1.3)
CREATININE, URINE THERAPEUTIC: 80.2 MG/DL — SIGNIFICANT CHANGE UP
GLUCOSE SERPL-MCNC: 95 MG/DL — SIGNIFICANT CHANGE UP (ref 70–99)
GRAM STN FLD: SIGNIFICANT CHANGE UP
HCT VFR BLD CALC: 21.2 % — LOW (ref 34.5–45)
HCT VFR BLD CALC: 22.8 % — LOW (ref 34.5–45)
HGB BLD-MCNC: 7 G/DL — CRITICAL LOW (ref 11.5–15.5)
HGB BLD-MCNC: 7.5 G/DL — LOW (ref 11.5–15.5)
MAGNESIUM SERPL-MCNC: 2.3 MG/DL — SIGNIFICANT CHANGE UP (ref 1.6–2.6)
MCHC RBC-ENTMCNC: 27.9 PG — SIGNIFICANT CHANGE UP (ref 27–34)
MCHC RBC-ENTMCNC: 28 PG — SIGNIFICANT CHANGE UP (ref 27–34)
MCHC RBC-ENTMCNC: 33 GM/DL — SIGNIFICANT CHANGE UP (ref 32–36)
MCHC RBC-ENTMCNC: 33 GM/DL — SIGNIFICANT CHANGE UP (ref 32–36)
MCV RBC AUTO: 84.6 FL — SIGNIFICANT CHANGE UP (ref 80–100)
MCV RBC AUTO: 84.8 FL — SIGNIFICANT CHANGE UP (ref 80–100)
METHADONE UR-MCNC: NEGATIVE — SIGNIFICANT CHANGE UP
METHAQUALONE UR QL: NEGATIVE — SIGNIFICANT CHANGE UP
METHAQUALONE UR-MCNC: NEGATIVE — SIGNIFICANT CHANGE UP
OPIATES UR-MCNC: NEGATIVE — SIGNIFICANT CHANGE UP
OSMOLALITY UR: 387 MOS/KG — SIGNIFICANT CHANGE UP (ref 300–900)
PCP UR-MCNC: NEGATIVE — SIGNIFICANT CHANGE UP
PHOSPHATE SERPL-MCNC: 3.6 MG/DL — SIGNIFICANT CHANGE UP (ref 2.5–4.5)
PLATELET # BLD AUTO: 376 K/UL — SIGNIFICANT CHANGE UP (ref 150–400)
PLATELET # BLD AUTO: 384 K/UL — SIGNIFICANT CHANGE UP (ref 150–400)
POTASSIUM SERPL-MCNC: 3.7 MMOL/L — SIGNIFICANT CHANGE UP (ref 3.5–5.3)
POTASSIUM SERPL-SCNC: 3.7 MMOL/L — SIGNIFICANT CHANGE UP (ref 3.5–5.3)
PROPOXYPH UR QL: NEGATIVE — SIGNIFICANT CHANGE UP
RBC # BLD: 2.5 M/UL — LOW (ref 3.8–5.2)
RBC # BLD: 2.69 M/UL — LOW (ref 3.8–5.2)
RBC # FLD: 15.3 % — HIGH (ref 10.3–14.5)
RBC # FLD: 15.4 % — HIGH (ref 10.3–14.5)
RH IG SCN BLD-IMP: POSITIVE — SIGNIFICANT CHANGE UP
SODIUM SERPL-SCNC: 136 MMOL/L — SIGNIFICANT CHANGE UP (ref 135–145)
SODIUM UR-SCNC: <20 MMOL/L — SIGNIFICANT CHANGE UP
THC UR QL: NEGATIVE — SIGNIFICANT CHANGE UP
WBC # BLD: 10.1 K/UL — SIGNIFICANT CHANGE UP (ref 3.8–10.5)
WBC # BLD: 10.4 K/UL — SIGNIFICANT CHANGE UP (ref 3.8–10.5)
WBC # FLD AUTO: 10.1 K/UL — SIGNIFICANT CHANGE UP (ref 3.8–10.5)
WBC # FLD AUTO: 10.4 K/UL — SIGNIFICANT CHANGE UP (ref 3.8–10.5)

## 2017-04-15 PROCEDURE — 99222 1ST HOSP IP/OBS MODERATE 55: CPT | Mod: GC

## 2017-04-15 PROCEDURE — 76775 US EXAM ABDO BACK WALL LIM: CPT | Mod: 26

## 2017-04-15 PROCEDURE — 99233 SBSQ HOSP IP/OBS HIGH 50: CPT | Mod: GC

## 2017-04-15 RX ORDER — SOD SULF/SODIUM/NAHCO3/KCL/PEG
4000 SOLUTION, RECONSTITUTED, ORAL ORAL ONCE
Qty: 0 | Refills: 0 | Status: DISCONTINUED | OUTPATIENT
Start: 2017-04-15 | End: 2017-04-17

## 2017-04-15 RX ORDER — VANCOMYCIN HCL 1 G
1000 VIAL (EA) INTRAVENOUS ONCE
Qty: 0 | Refills: 0 | Status: DISCONTINUED | OUTPATIENT
Start: 2017-04-15 | End: 2017-04-15

## 2017-04-15 RX ORDER — INSULIN GLARGINE 100 [IU]/ML
12 INJECTION, SOLUTION SUBCUTANEOUS AT BEDTIME
Qty: 0 | Refills: 0 | Status: DISCONTINUED | OUTPATIENT
Start: 2017-04-15 | End: 2017-04-16

## 2017-04-15 RX ADMIN — HEPARIN SODIUM 5000 UNIT(S): 5000 INJECTION INTRAVENOUS; SUBCUTANEOUS at 14:33

## 2017-04-15 RX ADMIN — Medication 100 MILLIGRAM(S): at 05:42

## 2017-04-15 RX ADMIN — Medication 1 TABLET(S): at 13:45

## 2017-04-15 RX ADMIN — ASPIRIN AND DIPYRIDAMOLE 1 CAPSULE(S): 25; 200 CAPSULE, EXTENDED RELEASE ORAL at 05:44

## 2017-04-15 RX ADMIN — Medication 40 MILLIGRAM(S): at 13:45

## 2017-04-15 RX ADMIN — HEPARIN SODIUM 5000 UNIT(S): 5000 INJECTION INTRAVENOUS; SUBCUTANEOUS at 05:42

## 2017-04-15 RX ADMIN — HEPARIN SODIUM 5000 UNIT(S): 5000 INJECTION INTRAVENOUS; SUBCUTANEOUS at 22:28

## 2017-04-15 RX ADMIN — Medication 133 MILLILITER(S): at 12:45

## 2017-04-15 RX ADMIN — Medication 100 MILLIGRAM(S): at 00:30

## 2017-04-15 RX ADMIN — Medication 1: at 18:36

## 2017-04-15 RX ADMIN — Medication 3 UNIT(S): at 18:38

## 2017-04-15 RX ADMIN — POLYETHYLENE GLYCOL 3350 17 GRAM(S): 17 POWDER, FOR SOLUTION ORAL at 13:46

## 2017-04-15 RX ADMIN — Medication 100 MICROGRAM(S): at 11:51

## 2017-04-15 RX ADMIN — Medication 133 MILLILITER(S): at 13:32

## 2017-04-15 RX ADMIN — Medication 100 MILLIGRAM(S): at 18:38

## 2017-04-15 RX ADMIN — Medication 200 MILLIGRAM(S): at 05:44

## 2017-04-15 RX ADMIN — Medication 10 MILLIGRAM(S): at 05:43

## 2017-04-15 RX ADMIN — Medication 3 UNIT(S): at 09:19

## 2017-04-15 RX ADMIN — CAPTOPRIL 12.5 MILLIGRAM(S): 12.5 TABLET ORAL at 05:43

## 2017-04-15 RX ADMIN — ASPIRIN AND DIPYRIDAMOLE 1 CAPSULE(S): 25; 200 CAPSULE, EXTENDED RELEASE ORAL at 18:36

## 2017-04-15 RX ADMIN — Medication 100 MILLIGRAM(S): at 11:20

## 2017-04-15 RX ADMIN — Medication 3 UNIT(S): at 13:44

## 2017-04-15 RX ADMIN — INSULIN GLARGINE 12 UNIT(S): 100 INJECTION, SOLUTION SUBCUTANEOUS at 23:01

## 2017-04-15 RX ADMIN — SENNA PLUS 2 TABLET(S): 8.6 TABLET ORAL at 22:28

## 2017-04-15 RX ADMIN — Medication 1 ENEMA: at 23:02

## 2017-04-15 NOTE — PHYSICAL THERAPY INITIAL EVALUATION ADULT - RANGE OF MOTION EXAMINATION, REHAB EVAL
bilateral upper extremity ROM was WFL (within functional limits)/bilateral lower extremity ROM was WFL (within functional limits)/LANE ankle foot drop

## 2017-04-15 NOTE — PROVIDER CONTACT NOTE (OTHER) - ASSESSMENT
Pts abdomen distended and firm, XR shows large amount of fecal matter. Pt with multiple enema orders. One order reads administer enema four times a day, Second enema order reads administer 2 saline enemas after 2 mineral enemas.

## 2017-04-15 NOTE — PHYSICAL THERAPY INITIAL EVALUATION ADULT - BALANCE DISTURBANCE, IDENTIFIED IMPAIRMENT CONTRIBUTE, REHAB EVAL
decreased strength/impaired coordination/impaired postural control/impaired motor control/decreased ROM

## 2017-04-15 NOTE — PHYSICAL THERAPY INITIAL EVALUATION ADULT - MANUAL MUSCLE TESTING RESULTS, REHAB EVAL
grossly assessed due to/limited 2* pt inconsistenly following commands, .BUE/BLE grossly at least 3-/5 throughout

## 2017-04-15 NOTE — PHYSICAL THERAPY INITIAL EVALUATION ADULT - TRANSFER SAFETY CONCERNS NOTED: SIT/STAND, REHAB EVAL
losing balance/decreased safety awareness/decreased sequencing ability/decreased weight-shifting ability/decreased step length

## 2017-04-15 NOTE — PHYSICAL THERAPY INITIAL EVALUATION ADULT - IMPAIRED TRANSFERS: SIT/STAND, REHAB EVAL
decreased strength/impaired balance/decreased flexibility/impaired motor control/impaired postural control/impaired coordination/cognition/decreased ROM

## 2017-04-15 NOTE — PHYSICAL THERAPY INITIAL EVALUATION ADULT - IMPAIRMENTS CONTRIBUTING TO GAIT DEVIATIONS, PT EVAL
impaired coordination/impaired balance/impaired postural control/cognition/decreased ROM/decreased strength/impaired motor control

## 2017-04-15 NOTE — PHYSICAL THERAPY INITIAL EVALUATION ADULT - ADDITIONAL COMMENTS
4/13 CT Abdomen/Pelvis: Fecal impaction with large stool burden causing significant distention of the rectosigmoid colon.CT Head: Status post right frontal approach ventriculostomy catheter in unchanged   position. Unchanged ventricular sizes. No mass effect or intracranial hemorrhage. Abdomen X-ray: Gaseous distension indicating large stool.

## 2017-04-15 NOTE — PHYSICAL THERAPY INITIAL EVALUATION ADULT - LIVES WITH, PROFILE
spouse/Pt resides with spouse in a private home with 1 step. pt spouse verbalizes he provides assistance for pt's ADLs and daily livings. pt has a wheelchair, transport chair, walker, shower bench, bed rails, shower bars, and a scooter. spouse/Pt resides with spouse in a private home with 1 step. pt spouse verbalizes he provides assistance for pt's ADLs and daily livings. pt has a wheelchair, transport chair, walker, shower bench, bed rails, shower bars, and a scooter..  pt   reports pt received home PT & was able to ambulate short distances with assist.

## 2017-04-15 NOTE — PHYSICAL THERAPY INITIAL EVALUATION ADULT - MODALITIES TREATMENT COMMENTS
PAtient with full thickness ulceration Rt lateral malleolus, +necrotic slough, +exposed bone, +tracking/probing distal to fibula likely into ankle joint - Medicine team resident and ID made aware

## 2017-04-15 NOTE — PHYSICAL THERAPY INITIAL EVALUATION ADULT - DIAGNOSIS, PT EVAL
decreased functional mobility secondary to generalized weakness, impaired balance, impaired cognition, decreased ROM

## 2017-04-15 NOTE — PHYSICAL THERAPY INITIAL EVALUATION ADULT - IMPAIRMENTS CONTRIBUTING IMPAIRED BED MOBILITY, REHAB EVAL
decreased ROM/impaired balance/impaired coordination/cognition/impaired motor control/decreased strength/impaired postural control

## 2017-04-16 LAB
ANION GAP SERPL CALC-SCNC: 13 MMOL/L — SIGNIFICANT CHANGE UP (ref 5–17)
BUN SERPL-MCNC: 32 MG/DL — HIGH (ref 7–23)
CALCIUM SERPL-MCNC: 8.4 MG/DL — SIGNIFICANT CHANGE UP (ref 8.4–10.5)
CHLORIDE SERPL-SCNC: 108 MMOL/L — SIGNIFICANT CHANGE UP (ref 96–108)
CO2 SERPL-SCNC: 22 MMOL/L — SIGNIFICANT CHANGE UP (ref 22–31)
CREAT SERPL-MCNC: 1.34 MG/DL — HIGH (ref 0.5–1.3)
GLUCOSE SERPL-MCNC: 72 MG/DL — SIGNIFICANT CHANGE UP (ref 70–99)
HCT VFR BLD CALC: 22.6 % — LOW (ref 34.5–45)
HGB BLD-MCNC: 7.6 G/DL — LOW (ref 11.5–15.5)
MCHC RBC-ENTMCNC: 28.5 PG — SIGNIFICANT CHANGE UP (ref 27–34)
MCHC RBC-ENTMCNC: 33.5 GM/DL — SIGNIFICANT CHANGE UP (ref 32–36)
MCV RBC AUTO: 85.1 FL — SIGNIFICANT CHANGE UP (ref 80–100)
PLATELET # BLD AUTO: 423 K/UL — HIGH (ref 150–400)
POTASSIUM SERPL-MCNC: 3.6 MMOL/L — SIGNIFICANT CHANGE UP (ref 3.5–5.3)
POTASSIUM SERPL-SCNC: 3.6 MMOL/L — SIGNIFICANT CHANGE UP (ref 3.5–5.3)
RBC # BLD: 2.65 M/UL — LOW (ref 3.8–5.2)
RBC # FLD: 15.3 % — HIGH (ref 10.3–14.5)
SODIUM SERPL-SCNC: 143 MMOL/L — SIGNIFICANT CHANGE UP (ref 135–145)
WBC # BLD: 7.6 K/UL — SIGNIFICANT CHANGE UP (ref 3.8–10.5)
WBC # FLD AUTO: 7.6 K/UL — SIGNIFICANT CHANGE UP (ref 3.8–10.5)

## 2017-04-16 PROCEDURE — 74000: CPT | Mod: 26

## 2017-04-16 PROCEDURE — 99233 SBSQ HOSP IP/OBS HIGH 50: CPT | Mod: GC

## 2017-04-16 RX ORDER — SODIUM CHLORIDE 9 MG/ML
1000 INJECTION, SOLUTION INTRAVENOUS
Qty: 0 | Refills: 0 | Status: DISCONTINUED | OUTPATIENT
Start: 2017-04-16 | End: 2017-04-17

## 2017-04-16 RX ORDER — VANCOMYCIN HCL 1 G
1000 VIAL (EA) INTRAVENOUS EVERY 24 HOURS
Qty: 0 | Refills: 0 | Status: DISCONTINUED | OUTPATIENT
Start: 2017-04-16 | End: 2017-04-18

## 2017-04-16 RX ORDER — INSULIN GLARGINE 100 [IU]/ML
10 INJECTION, SOLUTION SUBCUTANEOUS AT BEDTIME
Qty: 0 | Refills: 0 | Status: DISCONTINUED | OUTPATIENT
Start: 2017-04-16 | End: 2017-04-16

## 2017-04-16 RX ORDER — INSULIN GLARGINE 100 [IU]/ML
8 INJECTION, SOLUTION SUBCUTANEOUS AT BEDTIME
Qty: 0 | Refills: 0 | Status: DISCONTINUED | OUTPATIENT
Start: 2017-04-16 | End: 2017-04-18

## 2017-04-16 RX ORDER — DEXTROSE 50 % IN WATER 50 %
50 SYRINGE (ML) INTRAVENOUS ONCE
Qty: 0 | Refills: 0 | Status: COMPLETED | OUTPATIENT
Start: 2017-04-16 | End: 2017-04-16

## 2017-04-16 RX ORDER — LACTULOSE 10 G/15ML
20 SOLUTION ORAL ONCE
Qty: 0 | Refills: 0 | Status: COMPLETED | OUTPATIENT
Start: 2017-04-16 | End: 2017-04-16

## 2017-04-16 RX ADMIN — SENNA PLUS 2 TABLET(S): 8.6 TABLET ORAL at 22:00

## 2017-04-16 RX ADMIN — Medication 50 MILLILITER(S): at 09:08

## 2017-04-16 RX ADMIN — HEPARIN SODIUM 5000 UNIT(S): 5000 INJECTION INTRAVENOUS; SUBCUTANEOUS at 13:21

## 2017-04-16 RX ADMIN — Medication 1: at 13:19

## 2017-04-16 RX ADMIN — Medication 3: at 09:36

## 2017-04-16 RX ADMIN — Medication 100 MILLIGRAM(S): at 06:43

## 2017-04-16 RX ADMIN — LACTULOSE 20 GRAM(S): 10 SOLUTION ORAL at 15:55

## 2017-04-16 RX ADMIN — INSULIN GLARGINE 8 UNIT(S): 100 INJECTION, SOLUTION SUBCUTANEOUS at 22:38

## 2017-04-16 RX ADMIN — Medication 3: at 18:17

## 2017-04-16 RX ADMIN — Medication 100 MILLIGRAM(S): at 17:30

## 2017-04-16 RX ADMIN — HEPARIN SODIUM 5000 UNIT(S): 5000 INJECTION INTRAVENOUS; SUBCUTANEOUS at 06:43

## 2017-04-16 RX ADMIN — ASPIRIN AND DIPYRIDAMOLE 1 CAPSULE(S): 25; 200 CAPSULE, EXTENDED RELEASE ORAL at 17:30

## 2017-04-16 RX ADMIN — Medication 100 MICROGRAM(S): at 13:06

## 2017-04-16 RX ADMIN — Medication 250 MILLIGRAM(S): at 11:33

## 2017-04-16 RX ADMIN — SODIUM CHLORIDE 30 MILLILITER(S): 9 INJECTION, SOLUTION INTRAVENOUS at 10:14

## 2017-04-16 RX ADMIN — ASPIRIN AND DIPYRIDAMOLE 1 CAPSULE(S): 25; 200 CAPSULE, EXTENDED RELEASE ORAL at 06:43

## 2017-04-16 RX ADMIN — Medication 1 TABLET(S): at 11:34

## 2017-04-16 RX ADMIN — Medication 40 MILLIGRAM(S): at 11:36

## 2017-04-16 RX ADMIN — HEPARIN SODIUM 5000 UNIT(S): 5000 INJECTION INTRAVENOUS; SUBCUTANEOUS at 22:00

## 2017-04-16 RX ADMIN — POLYETHYLENE GLYCOL 3350 17 GRAM(S): 17 POWDER, FOR SOLUTION ORAL at 11:37

## 2017-04-17 LAB
-  AMPICILLIN/SULBACTAM: SIGNIFICANT CHANGE UP
-  CEFAZOLIN: SIGNIFICANT CHANGE UP
-  CIPROFLOXACIN: SIGNIFICANT CHANGE UP
-  CLINDAMYCIN: SIGNIFICANT CHANGE UP
-  DAPTOMYCIN: SIGNIFICANT CHANGE UP
-  ERYTHROMYCIN: SIGNIFICANT CHANGE UP
-  GENTAMICIN: SIGNIFICANT CHANGE UP
-  LEVOFLOXACIN: SIGNIFICANT CHANGE UP
-  LINEZOLID: SIGNIFICANT CHANGE UP
-  MOXIFLOXACIN(AEROBIC): SIGNIFICANT CHANGE UP
-  OXACILLIN: SIGNIFICANT CHANGE UP
-  PENICILLIN: SIGNIFICANT CHANGE UP
-  RIFAMPIN: SIGNIFICANT CHANGE UP
-  TETRACYCLINE: SIGNIFICANT CHANGE UP
-  TRIMETHOPRIM/SULFAMETHOXAZOLE: SIGNIFICANT CHANGE UP
-  VANCOMYCIN: SIGNIFICANT CHANGE UP
ALBUMIN SERPL ELPH-MCNC: 2.4 G/DL — LOW (ref 3.3–5)
ALP SERPL-CCNC: 136 U/L — HIGH (ref 40–120)
ALT FLD-CCNC: 10 U/L RC — SIGNIFICANT CHANGE UP (ref 10–45)
ANION GAP SERPL CALC-SCNC: 7 MMOL/L — SIGNIFICANT CHANGE UP (ref 5–17)
AST SERPL-CCNC: 16 U/L — SIGNIFICANT CHANGE UP (ref 10–40)
BILIRUB DIRECT SERPL-MCNC: 0.1 MG/DL — SIGNIFICANT CHANGE UP (ref 0–0.2)
BILIRUB INDIRECT FLD-MCNC: 0.1 MG/DL — LOW (ref 0.2–1)
BILIRUB SERPL-MCNC: 0.2 MG/DL — SIGNIFICANT CHANGE UP (ref 0.2–1.2)
BUN SERPL-MCNC: 20 MG/DL — SIGNIFICANT CHANGE UP (ref 7–23)
CALCIUM SERPL-MCNC: 7.8 MG/DL — LOW (ref 8.4–10.5)
CHLORIDE SERPL-SCNC: 108 MMOL/L — SIGNIFICANT CHANGE UP (ref 96–108)
CO2 SERPL-SCNC: 29 MMOL/L — SIGNIFICANT CHANGE UP (ref 22–31)
CREAT SERPL-MCNC: 0.99 MG/DL — SIGNIFICANT CHANGE UP (ref 0.5–1.3)
CULTURE RESULTS: SIGNIFICANT CHANGE UP
GLUCOSE SERPL-MCNC: 122 MG/DL — HIGH (ref 70–99)
HCT VFR BLD CALC: 23.2 % — LOW (ref 34.5–45)
HGB BLD-MCNC: 7.7 G/DL — LOW (ref 11.5–15.5)
MCHC RBC-ENTMCNC: 28.3 PG — SIGNIFICANT CHANGE UP (ref 27–34)
MCHC RBC-ENTMCNC: 33.1 GM/DL — SIGNIFICANT CHANGE UP (ref 32–36)
MCV RBC AUTO: 85.5 FL — SIGNIFICANT CHANGE UP (ref 80–100)
METHOD TYPE: SIGNIFICANT CHANGE UP
ORGANISM # SPEC MICROSCOPIC CNT: SIGNIFICANT CHANGE UP
ORGANISM # SPEC MICROSCOPIC CNT: SIGNIFICANT CHANGE UP
PLATELET # BLD AUTO: 461 K/UL — HIGH (ref 150–400)
POTASSIUM SERPL-MCNC: 3.3 MMOL/L — LOW (ref 3.5–5.3)
POTASSIUM SERPL-SCNC: 3.3 MMOL/L — LOW (ref 3.5–5.3)
PROT SERPL-MCNC: 6.2 G/DL — SIGNIFICANT CHANGE UP (ref 6–8.3)
RBC # BLD: 2.72 M/UL — LOW (ref 3.8–5.2)
RBC # FLD: 15.1 % — HIGH (ref 10.3–14.5)
SODIUM SERPL-SCNC: 144 MMOL/L — SIGNIFICANT CHANGE UP (ref 135–145)
SPECIMEN SOURCE: SIGNIFICANT CHANGE UP
WBC # BLD: 7.4 K/UL — SIGNIFICANT CHANGE UP (ref 3.8–10.5)
WBC # FLD AUTO: 7.4 K/UL — SIGNIFICANT CHANGE UP (ref 3.8–10.5)

## 2017-04-17 PROCEDURE — 93306 TTE W/DOPPLER COMPLETE: CPT | Mod: 26

## 2017-04-17 PROCEDURE — 99233 SBSQ HOSP IP/OBS HIGH 50: CPT | Mod: GC

## 2017-04-17 PROCEDURE — 99232 SBSQ HOSP IP/OBS MODERATE 35: CPT

## 2017-04-17 RX ORDER — MINERAL OIL
133 OIL (ML) MISCELLANEOUS
Qty: 0 | Refills: 0 | Status: DISCONTINUED | OUTPATIENT
Start: 2017-04-17 | End: 2017-04-24

## 2017-04-17 RX ORDER — POTASSIUM CHLORIDE 20 MEQ
40 PACKET (EA) ORAL ONCE
Qty: 0 | Refills: 0 | Status: COMPLETED | OUTPATIENT
Start: 2017-04-17 | End: 2017-04-17

## 2017-04-17 RX ADMIN — HEPARIN SODIUM 5000 UNIT(S): 5000 INJECTION INTRAVENOUS; SUBCUTANEOUS at 13:04

## 2017-04-17 RX ADMIN — ASPIRIN AND DIPYRIDAMOLE 1 CAPSULE(S): 25; 200 CAPSULE, EXTENDED RELEASE ORAL at 17:06

## 2017-04-17 RX ADMIN — HEPARIN SODIUM 5000 UNIT(S): 5000 INJECTION INTRAVENOUS; SUBCUTANEOUS at 05:02

## 2017-04-17 RX ADMIN — INSULIN GLARGINE 8 UNIT(S): 100 INJECTION, SOLUTION SUBCUTANEOUS at 21:41

## 2017-04-17 RX ADMIN — Medication 1 TABLET(S): at 11:47

## 2017-04-17 RX ADMIN — SENNA PLUS 2 TABLET(S): 8.6 TABLET ORAL at 21:40

## 2017-04-17 RX ADMIN — Medication 100 MILLIGRAM(S): at 17:06

## 2017-04-17 RX ADMIN — Medication 133 MILLILITER(S): at 17:04

## 2017-04-17 RX ADMIN — POLYETHYLENE GLYCOL 3350 17 GRAM(S): 17 POWDER, FOR SOLUTION ORAL at 11:46

## 2017-04-17 RX ADMIN — Medication 100 MICROGRAM(S): at 11:47

## 2017-04-17 RX ADMIN — Medication 3: at 18:19

## 2017-04-17 RX ADMIN — Medication 40 MILLIGRAM(S): at 11:47

## 2017-04-17 RX ADMIN — Medication 2: at 21:41

## 2017-04-17 RX ADMIN — ASPIRIN AND DIPYRIDAMOLE 1 CAPSULE(S): 25; 200 CAPSULE, EXTENDED RELEASE ORAL at 05:02

## 2017-04-17 RX ADMIN — Medication 250 MILLIGRAM(S): at 11:48

## 2017-04-17 RX ADMIN — HEPARIN SODIUM 5000 UNIT(S): 5000 INJECTION INTRAVENOUS; SUBCUTANEOUS at 21:40

## 2017-04-17 RX ADMIN — Medication 40 MILLIEQUIVALENT(S): at 08:27

## 2017-04-17 RX ADMIN — Medication 100 MILLIGRAM(S): at 05:02

## 2017-04-17 NOTE — PROVIDER CONTACT NOTE (OTHER) - ASSESSMENT
Pt presents with 13 beats of WCT. Pt denies CP, palpitations, and shortness of breath. /51, HR 71, T 97.8, RR 18, SpO2 94% room air. Pt resting comfortably at time of event. A&Ox2-3.

## 2017-04-17 NOTE — PROVIDER CONTACT NOTE (CRITICAL VALUE NOTIFICATION) - ASSESSMENT
Pt A&Ox2-3, forgetfulness. VSS. /69, HR 85, T 98.4, RR 18, SpO2 95% room air. Wound care completed as ordered. Blood culture drain from 4/13/17 resulted : Positive MRSA from aerobic bottle. Pt receiving vancomycin 1000mg daily.

## 2017-04-18 LAB
ANION GAP SERPL CALC-SCNC: 12 MMOL/L — SIGNIFICANT CHANGE UP (ref 5–17)
BUN SERPL-MCNC: 16 MG/DL — SIGNIFICANT CHANGE UP (ref 7–23)
CALCIUM SERPL-MCNC: 8.1 MG/DL — LOW (ref 8.4–10.5)
CHLORIDE SERPL-SCNC: 108 MMOL/L — SIGNIFICANT CHANGE UP (ref 96–108)
CO2 SERPL-SCNC: 23 MMOL/L — SIGNIFICANT CHANGE UP (ref 22–31)
CREAT SERPL-MCNC: 0.97 MG/DL — SIGNIFICANT CHANGE UP (ref 0.5–1.3)
CRP SERPL-MCNC: 7.58 MG/DL — HIGH (ref 0–0.4)
CULTURE RESULTS: SIGNIFICANT CHANGE UP
ERYTHROCYTE [SEDIMENTATION RATE] IN BLOOD: 116 MM/HR — HIGH (ref 0–20)
GLUCOSE SERPL-MCNC: 210 MG/DL — HIGH (ref 70–99)
HCT VFR BLD CALC: 23.5 % — LOW (ref 34.5–45)
HGB BLD-MCNC: 7.6 G/DL — LOW (ref 11.5–15.5)
MCHC RBC-ENTMCNC: 27.8 PG — SIGNIFICANT CHANGE UP (ref 27–34)
MCHC RBC-ENTMCNC: 32.5 GM/DL — SIGNIFICANT CHANGE UP (ref 32–36)
MCV RBC AUTO: 85.6 FL — SIGNIFICANT CHANGE UP (ref 80–100)
PLATELET # BLD AUTO: 439 K/UL — HIGH (ref 150–400)
POTASSIUM SERPL-MCNC: 3.7 MMOL/L — SIGNIFICANT CHANGE UP (ref 3.5–5.3)
POTASSIUM SERPL-SCNC: 3.7 MMOL/L — SIGNIFICANT CHANGE UP (ref 3.5–5.3)
RBC # BLD: 2.75 M/UL — LOW (ref 3.8–5.2)
RBC # FLD: 15.4 % — HIGH (ref 10.3–14.5)
SODIUM SERPL-SCNC: 143 MMOL/L — SIGNIFICANT CHANGE UP (ref 135–145)
SPECIMEN SOURCE: SIGNIFICANT CHANGE UP
VANCOMYCIN TROUGH SERPL-MCNC: 9.9 UG/ML — LOW (ref 10–20)
WBC # BLD: 8.4 K/UL — SIGNIFICANT CHANGE UP (ref 3.8–10.5)
WBC # FLD AUTO: 8.4 K/UL — SIGNIFICANT CHANGE UP (ref 3.8–10.5)

## 2017-04-18 PROCEDURE — 99232 SBSQ HOSP IP/OBS MODERATE 35: CPT

## 2017-04-18 PROCEDURE — 99233 SBSQ HOSP IP/OBS HIGH 50: CPT | Mod: GC

## 2017-04-18 RX ORDER — INSULIN LISPRO 100/ML
VIAL (ML) SUBCUTANEOUS
Qty: 0 | Refills: 0 | Status: DISCONTINUED | OUTPATIENT
Start: 2017-04-18 | End: 2017-04-20

## 2017-04-18 RX ORDER — VANCOMYCIN HCL 1 G
1250 VIAL (EA) INTRAVENOUS EVERY 24 HOURS
Qty: 0 | Refills: 0 | Status: DISCONTINUED | OUTPATIENT
Start: 2017-04-19 | End: 2017-04-21

## 2017-04-18 RX ORDER — INSULIN LISPRO 100/ML
2 VIAL (ML) SUBCUTANEOUS
Qty: 0 | Refills: 0 | Status: DISCONTINUED | OUTPATIENT
Start: 2017-04-18 | End: 2017-04-20

## 2017-04-18 RX ORDER — VANCOMYCIN HCL 1 G
1000 VIAL (EA) INTRAVENOUS EVERY 12 HOURS
Qty: 0 | Refills: 0 | Status: DISCONTINUED | OUTPATIENT
Start: 2017-04-18 | End: 2017-04-18

## 2017-04-18 RX ORDER — VANCOMYCIN HCL 1 G
VIAL (EA) INTRAVENOUS
Qty: 0 | Refills: 0 | Status: DISCONTINUED | OUTPATIENT
Start: 2017-04-18 | End: 2017-04-18

## 2017-04-18 RX ORDER — INSULIN GLARGINE 100 [IU]/ML
10 INJECTION, SOLUTION SUBCUTANEOUS AT BEDTIME
Qty: 0 | Refills: 0 | Status: DISCONTINUED | OUTPATIENT
Start: 2017-04-18 | End: 2017-04-19

## 2017-04-18 RX ORDER — VANCOMYCIN HCL 1 G
1000 VIAL (EA) INTRAVENOUS ONCE
Qty: 0 | Refills: 0 | Status: COMPLETED | OUTPATIENT
Start: 2017-04-18 | End: 2017-04-18

## 2017-04-18 RX ADMIN — HEPARIN SODIUM 5000 UNIT(S): 5000 INJECTION INTRAVENOUS; SUBCUTANEOUS at 22:00

## 2017-04-18 RX ADMIN — Medication 40 MILLIGRAM(S): at 12:09

## 2017-04-18 RX ADMIN — ASPIRIN AND DIPYRIDAMOLE 1 CAPSULE(S): 25; 200 CAPSULE, EXTENDED RELEASE ORAL at 05:53

## 2017-04-18 RX ADMIN — Medication 133 MILLILITER(S): at 05:53

## 2017-04-18 RX ADMIN — HEPARIN SODIUM 5000 UNIT(S): 5000 INJECTION INTRAVENOUS; SUBCUTANEOUS at 14:01

## 2017-04-18 RX ADMIN — Medication 2: at 12:01

## 2017-04-18 RX ADMIN — POLYETHYLENE GLYCOL 3350 17 GRAM(S): 17 POWDER, FOR SOLUTION ORAL at 12:08

## 2017-04-18 RX ADMIN — Medication 2: at 18:33

## 2017-04-18 RX ADMIN — Medication 1 TABLET(S): at 12:08

## 2017-04-18 RX ADMIN — INSULIN GLARGINE 10 UNIT(S): 100 INJECTION, SOLUTION SUBCUTANEOUS at 22:27

## 2017-04-18 RX ADMIN — Medication 100 MICROGRAM(S): at 12:09

## 2017-04-18 RX ADMIN — HEPARIN SODIUM 5000 UNIT(S): 5000 INJECTION INTRAVENOUS; SUBCUTANEOUS at 05:52

## 2017-04-18 RX ADMIN — Medication 1: at 02:15

## 2017-04-18 RX ADMIN — Medication 2: at 09:25

## 2017-04-18 RX ADMIN — Medication 250 MILLIGRAM(S): at 11:58

## 2017-04-18 RX ADMIN — SENNA PLUS 2 TABLET(S): 8.6 TABLET ORAL at 22:00

## 2017-04-18 RX ADMIN — Medication 100 MILLIGRAM(S): at 05:53

## 2017-04-18 RX ADMIN — Medication 133 MILLILITER(S): at 18:33

## 2017-04-18 RX ADMIN — ASPIRIN AND DIPYRIDAMOLE 1 CAPSULE(S): 25; 200 CAPSULE, EXTENDED RELEASE ORAL at 18:33

## 2017-04-19 LAB
ANION GAP SERPL CALC-SCNC: 11 MMOL/L — SIGNIFICANT CHANGE UP (ref 5–17)
APTT BLD: 28.5 SEC — SIGNIFICANT CHANGE UP (ref 27.5–37.4)
BUN SERPL-MCNC: 14 MG/DL — SIGNIFICANT CHANGE UP (ref 7–23)
CALCIUM SERPL-MCNC: 8.3 MG/DL — LOW (ref 8.4–10.5)
CHLORIDE SERPL-SCNC: 107 MMOL/L — SIGNIFICANT CHANGE UP (ref 96–108)
CO2 SERPL-SCNC: 24 MMOL/L — SIGNIFICANT CHANGE UP (ref 22–31)
CREAT SERPL-MCNC: 0.9 MG/DL — SIGNIFICANT CHANGE UP (ref 0.5–1.3)
GLUCOSE SERPL-MCNC: 159 MG/DL — HIGH (ref 70–99)
HCT VFR BLD CALC: 24 % — LOW (ref 34.5–45)
HGB BLD-MCNC: 7.7 G/DL — LOW (ref 11.5–15.5)
INR BLD: 1.13 RATIO — SIGNIFICANT CHANGE UP (ref 0.88–1.16)
MAGNESIUM SERPL-MCNC: 2 MG/DL — SIGNIFICANT CHANGE UP (ref 1.6–2.6)
MCHC RBC-ENTMCNC: 27.3 PG — SIGNIFICANT CHANGE UP (ref 27–34)
MCHC RBC-ENTMCNC: 32.1 GM/DL — SIGNIFICANT CHANGE UP (ref 32–36)
MCV RBC AUTO: 85.1 FL — SIGNIFICANT CHANGE UP (ref 80–100)
PHOSPHATE SERPL-MCNC: 2 MG/DL — LOW (ref 2.5–4.5)
PLATELET # BLD AUTO: 466 K/UL — HIGH (ref 150–400)
POTASSIUM SERPL-MCNC: 3.6 MMOL/L — SIGNIFICANT CHANGE UP (ref 3.5–5.3)
POTASSIUM SERPL-SCNC: 3.6 MMOL/L — SIGNIFICANT CHANGE UP (ref 3.5–5.3)
PROTHROM AB SERPL-ACNC: 12.4 SEC — SIGNIFICANT CHANGE UP (ref 9.8–12.7)
RBC # BLD: 2.82 M/UL — LOW (ref 3.8–5.2)
RBC # FLD: 15.5 % — HIGH (ref 10.3–14.5)
SODIUM SERPL-SCNC: 142 MMOL/L — SIGNIFICANT CHANGE UP (ref 135–145)
WBC # BLD: 10.9 K/UL — HIGH (ref 3.8–10.5)
WBC # FLD AUTO: 10.9 K/UL — HIGH (ref 3.8–10.5)

## 2017-04-19 PROCEDURE — 93325 DOPPLER ECHO COLOR FLOW MAPG: CPT | Mod: 26

## 2017-04-19 PROCEDURE — 93923 UPR/LXTR ART STDY 3+ LVLS: CPT | Mod: 26

## 2017-04-19 PROCEDURE — 93312 ECHO TRANSESOPHAGEAL: CPT | Mod: 26

## 2017-04-19 PROCEDURE — 99233 SBSQ HOSP IP/OBS HIGH 50: CPT | Mod: GC

## 2017-04-19 PROCEDURE — 93320 DOPPLER ECHO COMPLETE: CPT | Mod: 26

## 2017-04-19 RX ORDER — METOPROLOL TARTRATE 50 MG
25 TABLET ORAL
Qty: 0 | Refills: 0 | Status: DISCONTINUED | OUTPATIENT
Start: 2017-04-19 | End: 2017-04-21

## 2017-04-19 RX ORDER — DIGOXIN 250 MCG
0.12 TABLET ORAL DAILY
Qty: 0 | Refills: 0 | Status: DISCONTINUED | OUTPATIENT
Start: 2017-04-19 | End: 2017-04-24

## 2017-04-19 RX ORDER — POTASSIUM PHOSPHATE, MONOBASIC POTASSIUM PHOSPHATE, DIBASIC 236; 224 MG/ML; MG/ML
15 INJECTION, SOLUTION INTRAVENOUS ONCE
Qty: 0 | Refills: 0 | Status: COMPLETED | OUTPATIENT
Start: 2017-04-19 | End: 2017-04-19

## 2017-04-19 RX ORDER — INSULIN GLARGINE 100 [IU]/ML
8 INJECTION, SOLUTION SUBCUTANEOUS AT BEDTIME
Qty: 0 | Refills: 0 | Status: DISCONTINUED | OUTPATIENT
Start: 2017-04-19 | End: 2017-04-20

## 2017-04-19 RX ORDER — DIGOXIN 250 MCG
0.12 TABLET ORAL DAILY
Qty: 0 | Refills: 0 | Status: DISCONTINUED | OUTPATIENT
Start: 2017-04-19 | End: 2017-04-19

## 2017-04-19 RX ORDER — LISINOPRIL 2.5 MG/1
2.5 TABLET ORAL DAILY
Qty: 0 | Refills: 0 | Status: DISCONTINUED | OUTPATIENT
Start: 2017-04-19 | End: 2017-04-24

## 2017-04-19 RX ADMIN — Medication 100 MILLIGRAM(S): at 05:47

## 2017-04-19 RX ADMIN — ASPIRIN AND DIPYRIDAMOLE 1 CAPSULE(S): 25; 200 CAPSULE, EXTENDED RELEASE ORAL at 18:14

## 2017-04-19 RX ADMIN — Medication 1 TABLET(S): at 18:10

## 2017-04-19 RX ADMIN — POLYETHYLENE GLYCOL 3350 17 GRAM(S): 17 POWDER, FOR SOLUTION ORAL at 18:11

## 2017-04-19 RX ADMIN — Medication 100 MILLIGRAM(S): at 18:15

## 2017-04-19 RX ADMIN — HEPARIN SODIUM 5000 UNIT(S): 5000 INJECTION INTRAVENOUS; SUBCUTANEOUS at 05:46

## 2017-04-19 RX ADMIN — Medication 133 MILLILITER(S): at 19:43

## 2017-04-19 RX ADMIN — Medication 2 UNIT(S): at 18:13

## 2017-04-19 RX ADMIN — SENNA PLUS 2 TABLET(S): 8.6 TABLET ORAL at 22:17

## 2017-04-19 RX ADMIN — LISINOPRIL 2.5 MILLIGRAM(S): 2.5 TABLET ORAL at 18:22

## 2017-04-19 RX ADMIN — Medication 25 MILLIGRAM(S): at 18:23

## 2017-04-19 RX ADMIN — Medication 133 MILLILITER(S): at 05:46

## 2017-04-19 RX ADMIN — Medication 40 MILLIGRAM(S): at 18:11

## 2017-04-19 RX ADMIN — INSULIN GLARGINE 8 UNIT(S): 100 INJECTION, SOLUTION SUBCUTANEOUS at 22:17

## 2017-04-19 RX ADMIN — Medication 166.67 MILLIGRAM(S): at 18:31

## 2017-04-19 RX ADMIN — ASPIRIN AND DIPYRIDAMOLE 1 CAPSULE(S): 25; 200 CAPSULE, EXTENDED RELEASE ORAL at 05:47

## 2017-04-19 RX ADMIN — HEPARIN SODIUM 5000 UNIT(S): 5000 INJECTION INTRAVENOUS; SUBCUTANEOUS at 22:17

## 2017-04-19 RX ADMIN — Medication 0.12 MILLIGRAM(S): at 18:22

## 2017-04-19 RX ADMIN — POTASSIUM PHOSPHATE, MONOBASIC POTASSIUM PHOSPHATE, DIBASIC 62.5 MILLIMOLE(S): 236; 224 INJECTION, SOLUTION INTRAVENOUS at 22:17

## 2017-04-20 LAB
ANION GAP SERPL CALC-SCNC: 11 MMOL/L — SIGNIFICANT CHANGE UP (ref 5–17)
BUN SERPL-MCNC: 12 MG/DL — SIGNIFICANT CHANGE UP (ref 7–23)
CALCIUM SERPL-MCNC: 8 MG/DL — LOW (ref 8.4–10.5)
CHLORIDE SERPL-SCNC: 110 MMOL/L — HIGH (ref 96–108)
CO2 SERPL-SCNC: 24 MMOL/L — SIGNIFICANT CHANGE UP (ref 22–31)
CREAT SERPL-MCNC: 0.92 MG/DL — SIGNIFICANT CHANGE UP (ref 0.5–1.3)
CRP SERPL-MCNC: 3.97 MG/DL — HIGH (ref 0–0.4)
GLUCOSE SERPL-MCNC: 77 MG/DL — SIGNIFICANT CHANGE UP (ref 70–99)
MAGNESIUM SERPL-MCNC: 1.9 MG/DL — SIGNIFICANT CHANGE UP (ref 1.6–2.6)
PHOSPHATE SERPL-MCNC: 3.1 MG/DL — SIGNIFICANT CHANGE UP (ref 2.5–4.5)
POTASSIUM SERPL-MCNC: 3.8 MMOL/L — SIGNIFICANT CHANGE UP (ref 3.5–5.3)
POTASSIUM SERPL-SCNC: 3.8 MMOL/L — SIGNIFICANT CHANGE UP (ref 3.5–5.3)
SODIUM SERPL-SCNC: 145 MMOL/L — SIGNIFICANT CHANGE UP (ref 135–145)

## 2017-04-20 PROCEDURE — 99232 SBSQ HOSP IP/OBS MODERATE 35: CPT

## 2017-04-20 PROCEDURE — 73610 X-RAY EXAM OF ANKLE: CPT | Mod: 26,RT

## 2017-04-20 PROCEDURE — 99233 SBSQ HOSP IP/OBS HIGH 50: CPT | Mod: GC

## 2017-04-20 RX ORDER — INSULIN LISPRO 100/ML
VIAL (ML) SUBCUTANEOUS
Qty: 0 | Refills: 0 | Status: DISCONTINUED | OUTPATIENT
Start: 2017-04-20 | End: 2017-04-24

## 2017-04-20 RX ORDER — ATORVASTATIN CALCIUM 80 MG/1
80 TABLET, FILM COATED ORAL AT BEDTIME
Qty: 0 | Refills: 0 | Status: DISCONTINUED | OUTPATIENT
Start: 2017-04-20 | End: 2017-04-24

## 2017-04-20 RX ORDER — INSULIN GLARGINE 100 [IU]/ML
6 INJECTION, SOLUTION SUBCUTANEOUS AT BEDTIME
Qty: 0 | Refills: 0 | Status: DISCONTINUED | OUTPATIENT
Start: 2017-04-20 | End: 2017-04-21

## 2017-04-20 RX ORDER — COLLAGENASE CLOSTRIDIUM HIST. 250 UNIT/G
1 OINTMENT (GRAM) TOPICAL DAILY
Qty: 0 | Refills: 0 | Status: DISCONTINUED | OUTPATIENT
Start: 2017-04-20 | End: 2017-04-24

## 2017-04-20 RX ADMIN — Medication 1 TABLET(S): at 13:36

## 2017-04-20 RX ADMIN — POLYETHYLENE GLYCOL 3350 17 GRAM(S): 17 POWDER, FOR SOLUTION ORAL at 13:36

## 2017-04-20 RX ADMIN — Medication 100 MILLIGRAM(S): at 06:07

## 2017-04-20 RX ADMIN — Medication 166.67 MILLIGRAM(S): at 13:36

## 2017-04-20 RX ADMIN — Medication 2 UNIT(S): at 09:39

## 2017-04-20 RX ADMIN — SENNA PLUS 2 TABLET(S): 8.6 TABLET ORAL at 22:15

## 2017-04-20 RX ADMIN — INSULIN GLARGINE 6 UNIT(S): 100 INJECTION, SOLUTION SUBCUTANEOUS at 22:14

## 2017-04-20 RX ADMIN — HEPARIN SODIUM 5000 UNIT(S): 5000 INJECTION INTRAVENOUS; SUBCUTANEOUS at 13:36

## 2017-04-20 RX ADMIN — ASPIRIN AND DIPYRIDAMOLE 1 CAPSULE(S): 25; 200 CAPSULE, EXTENDED RELEASE ORAL at 06:07

## 2017-04-20 RX ADMIN — ASPIRIN AND DIPYRIDAMOLE 1 CAPSULE(S): 25; 200 CAPSULE, EXTENDED RELEASE ORAL at 18:13

## 2017-04-20 RX ADMIN — HEPARIN SODIUM 5000 UNIT(S): 5000 INJECTION INTRAVENOUS; SUBCUTANEOUS at 22:14

## 2017-04-20 RX ADMIN — ATORVASTATIN CALCIUM 80 MILLIGRAM(S): 80 TABLET, FILM COATED ORAL at 22:13

## 2017-04-20 RX ADMIN — Medication 25 MILLIGRAM(S): at 06:10

## 2017-04-20 RX ADMIN — Medication 133 MILLILITER(S): at 06:07

## 2017-04-20 RX ADMIN — Medication 0.12 MILLIGRAM(S): at 13:35

## 2017-04-20 RX ADMIN — Medication 100 MILLIGRAM(S): at 18:12

## 2017-04-20 RX ADMIN — LISINOPRIL 2.5 MILLIGRAM(S): 2.5 TABLET ORAL at 13:36

## 2017-04-20 RX ADMIN — HEPARIN SODIUM 5000 UNIT(S): 5000 INJECTION INTRAVENOUS; SUBCUTANEOUS at 06:07

## 2017-04-20 RX ADMIN — Medication 1 APPLICATION(S): at 22:10

## 2017-04-20 RX ADMIN — Medication 2 UNIT(S): at 13:37

## 2017-04-20 RX ADMIN — Medication 133 MILLILITER(S): at 18:12

## 2017-04-20 RX ADMIN — Medication 25 MILLIGRAM(S): at 18:13

## 2017-04-20 NOTE — PROVIDER CONTACT NOTE (OTHER) - ASSESSMENT
Patient asymptomatic. Patient was sleeping at that time. Patient denies chest pain and shortness of breath. Patient's Heart Rhythm is Normal Sinus Rhythm on the cardiac monitor. Patient's Heart Rate 74, Blood Pressure 140/69, Respiratory Rate 18 and O2 Saturation 97% Room Air.

## 2017-04-21 LAB
ANION GAP SERPL CALC-SCNC: 12 MMOL/L — SIGNIFICANT CHANGE UP (ref 5–17)
BUN SERPL-MCNC: 15 MG/DL — SIGNIFICANT CHANGE UP (ref 7–23)
CALCIUM SERPL-MCNC: 8.3 MG/DL — LOW (ref 8.4–10.5)
CHLORIDE SERPL-SCNC: 108 MMOL/L — SIGNIFICANT CHANGE UP (ref 96–108)
CO2 SERPL-SCNC: 22 MMOL/L — SIGNIFICANT CHANGE UP (ref 22–31)
CREAT SERPL-MCNC: 0.9 MG/DL — SIGNIFICANT CHANGE UP (ref 0.5–1.3)
CULTURE RESULTS: SIGNIFICANT CHANGE UP
CULTURE RESULTS: SIGNIFICANT CHANGE UP
GLUCOSE SERPL-MCNC: 229 MG/DL — HIGH (ref 70–99)
HCT VFR BLD CALC: 24.4 % — LOW (ref 34.5–45)
HGB BLD-MCNC: 7.9 G/DL — LOW (ref 11.5–15.5)
MAGNESIUM SERPL-MCNC: 1.8 MG/DL — SIGNIFICANT CHANGE UP (ref 1.6–2.6)
MCHC RBC-ENTMCNC: 27.5 PG — SIGNIFICANT CHANGE UP (ref 27–34)
MCHC RBC-ENTMCNC: 32.3 GM/DL — SIGNIFICANT CHANGE UP (ref 32–36)
MCV RBC AUTO: 85 FL — SIGNIFICANT CHANGE UP (ref 80–100)
PHOSPHATE SERPL-MCNC: 2.6 MG/DL — SIGNIFICANT CHANGE UP (ref 2.5–4.5)
PLATELET # BLD AUTO: 441 K/UL — HIGH (ref 150–400)
POTASSIUM SERPL-MCNC: 3.9 MMOL/L — SIGNIFICANT CHANGE UP (ref 3.5–5.3)
POTASSIUM SERPL-SCNC: 3.9 MMOL/L — SIGNIFICANT CHANGE UP (ref 3.5–5.3)
RBC # BLD: 2.87 M/UL — LOW (ref 3.8–5.2)
RBC # FLD: 15.6 % — HIGH (ref 10.3–14.5)
SODIUM SERPL-SCNC: 142 MMOL/L — SIGNIFICANT CHANGE UP (ref 135–145)
SPECIMEN SOURCE: SIGNIFICANT CHANGE UP
SPECIMEN SOURCE: SIGNIFICANT CHANGE UP
VANCOMYCIN TROUGH SERPL-MCNC: 10.4 UG/ML — SIGNIFICANT CHANGE UP (ref 10–20)
WBC # BLD: 8.5 K/UL — SIGNIFICANT CHANGE UP (ref 3.8–10.5)
WBC # FLD AUTO: 8.5 K/UL — SIGNIFICANT CHANGE UP (ref 3.8–10.5)

## 2017-04-21 PROCEDURE — 99232 SBSQ HOSP IP/OBS MODERATE 35: CPT

## 2017-04-21 PROCEDURE — 99233 SBSQ HOSP IP/OBS HIGH 50: CPT | Mod: GC

## 2017-04-21 RX ORDER — INSULIN GLARGINE 100 [IU]/ML
8 INJECTION, SOLUTION SUBCUTANEOUS AT BEDTIME
Qty: 0 | Refills: 0 | Status: DISCONTINUED | OUTPATIENT
Start: 2017-04-21 | End: 2017-04-22

## 2017-04-21 RX ORDER — VANCOMYCIN HCL 1 G
750 VIAL (EA) INTRAVENOUS EVERY 12 HOURS
Qty: 0 | Refills: 0 | Status: DISCONTINUED | OUTPATIENT
Start: 2017-04-21 | End: 2017-04-24

## 2017-04-21 RX ORDER — METOPROLOL TARTRATE 50 MG
50 TABLET ORAL
Qty: 0 | Refills: 0 | Status: DISCONTINUED | OUTPATIENT
Start: 2017-04-21 | End: 2017-04-24

## 2017-04-21 RX ORDER — HEPARIN SODIUM 5000 [USP'U]/ML
5000 INJECTION INTRAVENOUS; SUBCUTANEOUS EVERY 8 HOURS
Qty: 0 | Refills: 0 | Status: COMPLETED | OUTPATIENT
Start: 2017-04-21 | End: 2017-04-23

## 2017-04-21 RX ADMIN — HEPARIN SODIUM 5000 UNIT(S): 5000 INJECTION INTRAVENOUS; SUBCUTANEOUS at 22:45

## 2017-04-21 RX ADMIN — ASPIRIN AND DIPYRIDAMOLE 1 CAPSULE(S): 25; 200 CAPSULE, EXTENDED RELEASE ORAL at 05:47

## 2017-04-21 RX ADMIN — Medication 3: at 18:07

## 2017-04-21 RX ADMIN — Medication 25 MILLIGRAM(S): at 05:47

## 2017-04-21 RX ADMIN — SENNA PLUS 2 TABLET(S): 8.6 TABLET ORAL at 22:46

## 2017-04-21 RX ADMIN — ASPIRIN AND DIPYRIDAMOLE 1 CAPSULE(S): 25; 200 CAPSULE, EXTENDED RELEASE ORAL at 17:20

## 2017-04-21 RX ADMIN — Medication 2: at 13:56

## 2017-04-21 RX ADMIN — Medication 133 MILLILITER(S): at 17:52

## 2017-04-21 RX ADMIN — Medication 100 MILLIGRAM(S): at 05:48

## 2017-04-21 RX ADMIN — Medication 50 MILLIGRAM(S): at 18:06

## 2017-04-21 RX ADMIN — Medication 0.12 MILLIGRAM(S): at 05:47

## 2017-04-21 RX ADMIN — Medication 1 APPLICATION(S): at 17:20

## 2017-04-21 RX ADMIN — Medication 40 MILLIGRAM(S): at 12:21

## 2017-04-21 RX ADMIN — Medication 1: at 22:45

## 2017-04-21 RX ADMIN — Medication 100 MILLIGRAM(S): at 17:21

## 2017-04-21 RX ADMIN — ATORVASTATIN CALCIUM 80 MILLIGRAM(S): 80 TABLET, FILM COATED ORAL at 22:46

## 2017-04-21 RX ADMIN — Medication 133 MILLILITER(S): at 05:48

## 2017-04-21 RX ADMIN — Medication 1 TABLET(S): at 12:21

## 2017-04-21 RX ADMIN — Medication 150 MILLIGRAM(S): at 17:25

## 2017-04-21 RX ADMIN — INSULIN GLARGINE 8 UNIT(S): 100 INJECTION, SOLUTION SUBCUTANEOUS at 22:55

## 2017-04-21 RX ADMIN — LISINOPRIL 2.5 MILLIGRAM(S): 2.5 TABLET ORAL at 05:48

## 2017-04-21 RX ADMIN — HEPARIN SODIUM 5000 UNIT(S): 5000 INJECTION INTRAVENOUS; SUBCUTANEOUS at 05:48

## 2017-04-22 LAB — VANCOMYCIN TROUGH SERPL-MCNC: 12.5 UG/ML — SIGNIFICANT CHANGE UP (ref 10–20)

## 2017-04-22 PROCEDURE — 99233 SBSQ HOSP IP/OBS HIGH 50: CPT | Mod: GC

## 2017-04-22 RX ORDER — INSULIN GLARGINE 100 [IU]/ML
8 INJECTION, SOLUTION SUBCUTANEOUS AT BEDTIME
Qty: 0 | Refills: 0 | Status: COMPLETED | OUTPATIENT
Start: 2017-04-22 | End: 2017-04-22

## 2017-04-22 RX ORDER — INSULIN GLARGINE 100 [IU]/ML
6 INJECTION, SOLUTION SUBCUTANEOUS AT BEDTIME
Qty: 0 | Refills: 0 | Status: DISCONTINUED | OUTPATIENT
Start: 2017-04-23 | End: 2017-04-24

## 2017-04-22 RX ADMIN — ASPIRIN AND DIPYRIDAMOLE 1 CAPSULE(S): 25; 200 CAPSULE, EXTENDED RELEASE ORAL at 17:30

## 2017-04-22 RX ADMIN — Medication 1 TABLET(S): at 13:16

## 2017-04-22 RX ADMIN — LISINOPRIL 2.5 MILLIGRAM(S): 2.5 TABLET ORAL at 06:02

## 2017-04-22 RX ADMIN — POLYETHYLENE GLYCOL 3350 17 GRAM(S): 17 POWDER, FOR SOLUTION ORAL at 13:16

## 2017-04-22 RX ADMIN — HEPARIN SODIUM 5000 UNIT(S): 5000 INJECTION INTRAVENOUS; SUBCUTANEOUS at 13:15

## 2017-04-22 RX ADMIN — Medication 1: at 22:28

## 2017-04-22 RX ADMIN — Medication 50 MILLIGRAM(S): at 06:03

## 2017-04-22 RX ADMIN — Medication 133 MILLILITER(S): at 06:02

## 2017-04-22 RX ADMIN — Medication 1 APPLICATION(S): at 16:58

## 2017-04-22 RX ADMIN — ATORVASTATIN CALCIUM 80 MILLIGRAM(S): 80 TABLET, FILM COATED ORAL at 22:29

## 2017-04-22 RX ADMIN — Medication 50 MILLIGRAM(S): at 17:30

## 2017-04-22 RX ADMIN — Medication 150 MILLIGRAM(S): at 17:30

## 2017-04-22 RX ADMIN — HEPARIN SODIUM 5000 UNIT(S): 5000 INJECTION INTRAVENOUS; SUBCUTANEOUS at 06:04

## 2017-04-22 RX ADMIN — Medication 3: at 18:17

## 2017-04-22 RX ADMIN — HEPARIN SODIUM 5000 UNIT(S): 5000 INJECTION INTRAVENOUS; SUBCUTANEOUS at 22:28

## 2017-04-22 RX ADMIN — ASPIRIN AND DIPYRIDAMOLE 1 CAPSULE(S): 25; 200 CAPSULE, EXTENDED RELEASE ORAL at 06:02

## 2017-04-22 RX ADMIN — Medication 0.12 MILLIGRAM(S): at 06:02

## 2017-04-22 RX ADMIN — Medication 100 MILLIGRAM(S): at 17:30

## 2017-04-22 RX ADMIN — INSULIN GLARGINE 8 UNIT(S): 100 INJECTION, SOLUTION SUBCUTANEOUS at 22:28

## 2017-04-22 RX ADMIN — Medication 1: at 09:25

## 2017-04-22 RX ADMIN — Medication 100 MILLIGRAM(S): at 06:05

## 2017-04-22 RX ADMIN — Medication 150 MILLIGRAM(S): at 06:03

## 2017-04-23 LAB
CREAT ?TM UR-MCNC: 103 MG/DL — SIGNIFICANT CHANGE UP
OSMOLALITY UR: 419 MOS/KG — SIGNIFICANT CHANGE UP (ref 300–900)
POTASSIUM UR-SCNC: 25 MMOL/L — SIGNIFICANT CHANGE UP
SODIUM UR-SCNC: 39 MMOL/L — SIGNIFICANT CHANGE UP
VANCOMYCIN TROUGH SERPL-MCNC: 21.4 UG/ML — HIGH (ref 10–20)

## 2017-04-23 PROCEDURE — 74000: CPT | Mod: 26

## 2017-04-23 PROCEDURE — 99233 SBSQ HOSP IP/OBS HIGH 50: CPT | Mod: GC

## 2017-04-23 RX ADMIN — Medication 1: at 09:48

## 2017-04-23 RX ADMIN — Medication 3: at 22:35

## 2017-04-23 RX ADMIN — HEPARIN SODIUM 5000 UNIT(S): 5000 INJECTION INTRAVENOUS; SUBCUTANEOUS at 05:26

## 2017-04-23 RX ADMIN — LISINOPRIL 2.5 MILLIGRAM(S): 2.5 TABLET ORAL at 05:26

## 2017-04-23 RX ADMIN — Medication 50 MILLIGRAM(S): at 05:25

## 2017-04-23 RX ADMIN — Medication 1: at 03:39

## 2017-04-23 RX ADMIN — Medication 1 TABLET(S): at 14:05

## 2017-04-23 RX ADMIN — Medication 133 MILLILITER(S): at 21:53

## 2017-04-23 RX ADMIN — HEPARIN SODIUM 5000 UNIT(S): 5000 INJECTION INTRAVENOUS; SUBCUTANEOUS at 14:03

## 2017-04-23 RX ADMIN — Medication 0.12 MILLIGRAM(S): at 05:25

## 2017-04-23 RX ADMIN — Medication 150 MILLIGRAM(S): at 05:26

## 2017-04-23 RX ADMIN — Medication 1 APPLICATION(S): at 14:04

## 2017-04-23 RX ADMIN — HEPARIN SODIUM 5000 UNIT(S): 5000 INJECTION INTRAVENOUS; SUBCUTANEOUS at 21:53

## 2017-04-23 RX ADMIN — Medication 3: at 14:03

## 2017-04-23 RX ADMIN — INSULIN GLARGINE 6 UNIT(S): 100 INJECTION, SOLUTION SUBCUTANEOUS at 22:34

## 2017-04-23 RX ADMIN — Medication 50 MILLIGRAM(S): at 18:51

## 2017-04-23 RX ADMIN — SENNA PLUS 2 TABLET(S): 8.6 TABLET ORAL at 21:53

## 2017-04-23 RX ADMIN — ATORVASTATIN CALCIUM 80 MILLIGRAM(S): 80 TABLET, FILM COATED ORAL at 21:53

## 2017-04-23 RX ADMIN — ASPIRIN AND DIPYRIDAMOLE 1 CAPSULE(S): 25; 200 CAPSULE, EXTENDED RELEASE ORAL at 05:25

## 2017-04-23 RX ADMIN — ASPIRIN AND DIPYRIDAMOLE 1 CAPSULE(S): 25; 200 CAPSULE, EXTENDED RELEASE ORAL at 18:51

## 2017-04-23 RX ADMIN — Medication 2: at 18:51

## 2017-04-23 RX ADMIN — Medication 100 MILLIGRAM(S): at 18:51

## 2017-04-24 ENCOUNTER — APPOINTMENT (OUTPATIENT)
Dept: CARDIOTHORACIC SURGERY | Facility: HOSPITAL | Age: 72
End: 2017-04-24

## 2017-04-24 LAB
ALBUMIN SERPL ELPH-MCNC: 2.4 G/DL — LOW (ref 3.3–5)
ALP SERPL-CCNC: 107 U/L — SIGNIFICANT CHANGE UP (ref 40–120)
ALT FLD-CCNC: 10 U/L RC — SIGNIFICANT CHANGE UP (ref 10–45)
ANION GAP SERPL CALC-SCNC: 12 MMOL/L — SIGNIFICANT CHANGE UP (ref 5–17)
ANION GAP SERPL CALC-SCNC: 13 MMOL/L — SIGNIFICANT CHANGE UP (ref 5–17)
APPEARANCE UR: ABNORMAL
APTT BLD: 29.5 SEC — SIGNIFICANT CHANGE UP (ref 27.5–37.4)
APTT BLD: 37.4 SEC — SIGNIFICANT CHANGE UP (ref 27.5–37.4)
AST SERPL-CCNC: 14 U/L — SIGNIFICANT CHANGE UP (ref 10–40)
BASOPHILS # BLD AUTO: 0 K/UL — SIGNIFICANT CHANGE UP (ref 0–0.2)
BASOPHILS NFR BLD AUTO: 0.4 % — SIGNIFICANT CHANGE UP (ref 0–2)
BILIRUB SERPL-MCNC: 0.2 MG/DL — SIGNIFICANT CHANGE UP (ref 0.2–1.2)
BILIRUB UR-MCNC: NEGATIVE — SIGNIFICANT CHANGE UP
BLD GP AB SCN SERPL QL: NEGATIVE — SIGNIFICANT CHANGE UP
BUN SERPL-MCNC: 17 MG/DL — SIGNIFICANT CHANGE UP (ref 7–23)
BUN SERPL-MCNC: 20 MG/DL — SIGNIFICANT CHANGE UP (ref 7–23)
CA-I BLD-SCNC: 1.15 MMOL/L — SIGNIFICANT CHANGE UP (ref 1.05–1.34)
CALCIUM SERPL-MCNC: 8 MG/DL — LOW (ref 8.4–10.5)
CALCIUM SERPL-MCNC: 8.5 MG/DL — SIGNIFICANT CHANGE UP (ref 8.4–10.5)
CHLORIDE SERPL-SCNC: 106 MMOL/L — SIGNIFICANT CHANGE UP (ref 96–108)
CHLORIDE SERPL-SCNC: 108 MMOL/L — SIGNIFICANT CHANGE UP (ref 96–108)
CK MB CFR SERPL CALC: 2.9 NG/ML — SIGNIFICANT CHANGE UP (ref 0–3.8)
CK SERPL-CCNC: 74 U/L — SIGNIFICANT CHANGE UP (ref 25–170)
CO2 SERPL-SCNC: 20 MMOL/L — LOW (ref 22–31)
CO2 SERPL-SCNC: 21 MMOL/L — LOW (ref 22–31)
COLOR SPEC: YELLOW — SIGNIFICANT CHANGE UP
CREAT SERPL-MCNC: 0.85 MG/DL — SIGNIFICANT CHANGE UP (ref 0.5–1.3)
CREAT SERPL-MCNC: 0.94 MG/DL — SIGNIFICANT CHANGE UP (ref 0.5–1.3)
DIFF PNL FLD: ABNORMAL
EOSINOPHIL # BLD AUTO: 0.3 K/UL — SIGNIFICANT CHANGE UP (ref 0–0.5)
EOSINOPHIL NFR BLD AUTO: 3.6 % — SIGNIFICANT CHANGE UP (ref 0–6)
FIBRINOGEN PPP-MCNC: 563 MG/DL — HIGH (ref 310–510)
GAS PNL BLDA: SIGNIFICANT CHANGE UP
GLUCOSE SERPL-MCNC: 193 MG/DL — HIGH (ref 70–99)
GLUCOSE SERPL-MCNC: 252 MG/DL — HIGH (ref 70–99)
GLUCOSE UR QL: NEGATIVE — SIGNIFICANT CHANGE UP
HCT VFR BLD CALC: 27.4 % — LOW (ref 34.5–45)
HCT VFR BLD CALC: 27.8 % — LOW (ref 34.5–45)
HGB BLD-MCNC: 8.6 G/DL — LOW (ref 11.5–15.5)
HGB BLD-MCNC: 9.5 G/DL — LOW (ref 11.5–15.5)
INR BLD: 1.12 RATIO — SIGNIFICANT CHANGE UP (ref 0.88–1.16)
INR BLD: 1.18 RATIO — HIGH (ref 0.88–1.16)
KETONES UR-MCNC: NEGATIVE — SIGNIFICANT CHANGE UP
LEUKOCYTE ESTERASE UR-ACNC: ABNORMAL
LYMPHOCYTES # BLD AUTO: 1.1 K/UL — SIGNIFICANT CHANGE UP (ref 1–3.3)
LYMPHOCYTES # BLD AUTO: 12.7 % — LOW (ref 13–44)
MAGNESIUM SERPL-MCNC: 1.8 MG/DL — SIGNIFICANT CHANGE UP (ref 1.6–2.6)
MCHC RBC-ENTMCNC: 26.6 PG — LOW (ref 27–34)
MCHC RBC-ENTMCNC: 28.7 PG — SIGNIFICANT CHANGE UP (ref 27–34)
MCHC RBC-ENTMCNC: 31.2 GM/DL — LOW (ref 32–36)
MCHC RBC-ENTMCNC: 34 GM/DL — SIGNIFICANT CHANGE UP (ref 32–36)
MCV RBC AUTO: 84.5 FL — SIGNIFICANT CHANGE UP (ref 80–100)
MCV RBC AUTO: 85.3 FL — SIGNIFICANT CHANGE UP (ref 80–100)
MONOCYTES # BLD AUTO: 0.7 K/UL — SIGNIFICANT CHANGE UP (ref 0–0.9)
MONOCYTES NFR BLD AUTO: 8.5 % — SIGNIFICANT CHANGE UP (ref 2–14)
NEUTROPHILS # BLD AUTO: 6.4 K/UL — SIGNIFICANT CHANGE UP (ref 1.8–7.4)
NEUTROPHILS NFR BLD AUTO: 74.8 % — SIGNIFICANT CHANGE UP (ref 43–77)
NITRITE UR-MCNC: POSITIVE
PH UR: 5.5 — SIGNIFICANT CHANGE UP (ref 5–8)
PHOSPHATE SERPL-MCNC: 2.7 MG/DL — SIGNIFICANT CHANGE UP (ref 2.5–4.5)
PLATELET # BLD AUTO: 339 K/UL — SIGNIFICANT CHANGE UP (ref 150–400)
PLATELET # BLD AUTO: 390 K/UL — SIGNIFICANT CHANGE UP (ref 150–400)
POTASSIUM SERPL-MCNC: 3.8 MMOL/L — SIGNIFICANT CHANGE UP (ref 3.5–5.3)
POTASSIUM SERPL-MCNC: 3.9 MMOL/L — SIGNIFICANT CHANGE UP (ref 3.5–5.3)
POTASSIUM SERPL-SCNC: 3.8 MMOL/L — SIGNIFICANT CHANGE UP (ref 3.5–5.3)
POTASSIUM SERPL-SCNC: 3.9 MMOL/L — SIGNIFICANT CHANGE UP (ref 3.5–5.3)
PROT SERPL-MCNC: 5.7 G/DL — LOW (ref 6–8.3)
PROT UR-MCNC: 100 MG/DL
PROTHROM AB SERPL-ACNC: 12.1 SEC — SIGNIFICANT CHANGE UP (ref 9.8–12.7)
PROTHROM AB SERPL-ACNC: 12.9 SEC — HIGH (ref 9.8–12.7)
RBC # BLD: 3.22 M/UL — LOW (ref 3.8–5.2)
RBC # BLD: 3.29 M/UL — LOW (ref 3.8–5.2)
RBC # FLD: 14.8 % — HIGH (ref 10.3–14.5)
RBC # FLD: 15.7 % — HIGH (ref 10.3–14.5)
RH IG SCN BLD-IMP: POSITIVE — SIGNIFICANT CHANGE UP
SODIUM SERPL-SCNC: 139 MMOL/L — SIGNIFICANT CHANGE UP (ref 135–145)
SODIUM SERPL-SCNC: 141 MMOL/L — SIGNIFICANT CHANGE UP (ref 135–145)
SP GR SPEC: 1.02 — SIGNIFICANT CHANGE UP (ref 1.01–1.02)
TROPONIN T SERPL-MCNC: 0.03 NG/ML — SIGNIFICANT CHANGE UP (ref 0–0.06)
UROBILINOGEN FLD QL: NEGATIVE — SIGNIFICANT CHANGE UP
WBC # BLD: 8.6 K/UL — SIGNIFICANT CHANGE UP (ref 3.8–10.5)
WBC # BLD: 8.6 K/UL — SIGNIFICANT CHANGE UP (ref 3.8–10.5)
WBC # FLD AUTO: 8.6 K/UL — SIGNIFICANT CHANGE UP (ref 3.8–10.5)
WBC # FLD AUTO: 8.6 K/UL — SIGNIFICANT CHANGE UP (ref 3.8–10.5)

## 2017-04-24 PROCEDURE — 93010 ELECTROCARDIOGRAM REPORT: CPT

## 2017-04-24 PROCEDURE — 71010: CPT | Mod: 26

## 2017-04-24 PROCEDURE — 99233 SBSQ HOSP IP/OBS HIGH 50: CPT | Mod: GC

## 2017-04-24 RX ORDER — POTASSIUM CHLORIDE 20 MEQ
10 PACKET (EA) ORAL
Qty: 0 | Refills: 0 | Status: DISCONTINUED | OUTPATIENT
Start: 2017-04-24 | End: 2017-04-25

## 2017-04-24 RX ORDER — INSULIN HUMAN 100 [IU]/ML
2 INJECTION, SOLUTION SUBCUTANEOUS
Qty: 250 | Refills: 0 | Status: DISCONTINUED | OUTPATIENT
Start: 2017-04-24 | End: 2017-04-24

## 2017-04-24 RX ORDER — METOCLOPRAMIDE HCL 10 MG
10 TABLET ORAL ONCE
Qty: 0 | Refills: 0 | Status: COMPLETED | OUTPATIENT
Start: 2017-04-24 | End: 2017-04-24

## 2017-04-24 RX ORDER — INSULIN GLARGINE 100 [IU]/ML
8 INJECTION, SOLUTION SUBCUTANEOUS AT BEDTIME
Qty: 0 | Refills: 0 | Status: DISCONTINUED | OUTPATIENT
Start: 2017-04-24 | End: 2017-04-24

## 2017-04-24 RX ORDER — NOREPINEPHRINE BITARTRATE/D5W 8 MG/250ML
0.04 PLASTIC BAG, INJECTION (ML) INTRAVENOUS
Qty: 8 | Refills: 0 | Status: DISCONTINUED | OUTPATIENT
Start: 2017-04-24 | End: 2017-04-24

## 2017-04-24 RX ORDER — SODIUM CHLORIDE 9 MG/ML
1000 INJECTION, SOLUTION INTRAVENOUS
Qty: 0 | Refills: 0 | Status: DISCONTINUED | OUTPATIENT
Start: 2017-04-24 | End: 2017-04-26

## 2017-04-24 RX ORDER — INSULIN HUMAN 100 [IU]/ML
3 INJECTION, SOLUTION SUBCUTANEOUS
Qty: 100 | Refills: 0 | Status: DISCONTINUED | OUTPATIENT
Start: 2017-04-24 | End: 2017-04-25

## 2017-04-24 RX ORDER — POTASSIUM CHLORIDE 20 MEQ
10 PACKET (EA) ORAL ONCE
Qty: 0 | Refills: 0 | Status: DISCONTINUED | OUTPATIENT
Start: 2017-04-24 | End: 2017-04-25

## 2017-04-24 RX ORDER — PANTOPRAZOLE SODIUM 20 MG/1
40 TABLET, DELAYED RELEASE ORAL DAILY
Qty: 0 | Refills: 0 | Status: DISCONTINUED | OUTPATIENT
Start: 2017-04-24 | End: 2017-04-26

## 2017-04-24 RX ORDER — ASPIRIN/CALCIUM CARB/MAGNESIUM 324 MG
325 TABLET ORAL DAILY
Qty: 0 | Refills: 0 | Status: DISCONTINUED | OUTPATIENT
Start: 2017-04-24 | End: 2017-04-25

## 2017-04-24 RX ORDER — MEPERIDINE HYDROCHLORIDE 50 MG/ML
25 INJECTION INTRAMUSCULAR; INTRAVENOUS; SUBCUTANEOUS ONCE
Qty: 0 | Refills: 0 | Status: DISCONTINUED | OUTPATIENT
Start: 2017-04-24 | End: 2017-04-24

## 2017-04-24 RX ADMIN — Medication 100 MILLIGRAM(S): at 05:25

## 2017-04-24 RX ADMIN — Medication 150 MILLIGRAM(S): at 05:25

## 2017-04-24 RX ADMIN — Medication 133 MILLILITER(S): at 08:21

## 2017-04-24 RX ADMIN — Medication 2: at 09:19

## 2017-04-24 RX ADMIN — Medication 10 MILLIGRAM(S): at 20:30

## 2017-04-24 RX ADMIN — Medication 0.12 MILLIGRAM(S): at 05:25

## 2017-04-24 RX ADMIN — ASPIRIN AND DIPYRIDAMOLE 1 CAPSULE(S): 25; 200 CAPSULE, EXTENDED RELEASE ORAL at 05:25

## 2017-04-24 RX ADMIN — Medication 50 MILLIGRAM(S): at 05:25

## 2017-04-24 RX ADMIN — Medication: at 02:06

## 2017-04-24 RX ADMIN — Medication 325 MILLIGRAM(S): at 22:17

## 2017-04-24 RX ADMIN — LISINOPRIL 2.5 MILLIGRAM(S): 2.5 TABLET ORAL at 05:25

## 2017-04-25 LAB
ANION GAP SERPL CALC-SCNC: 13 MMOL/L — SIGNIFICANT CHANGE UP (ref 5–17)
BUN SERPL-MCNC: 16 MG/DL — SIGNIFICANT CHANGE UP (ref 7–23)
CALCIUM SERPL-MCNC: 7.9 MG/DL — LOW (ref 8.4–10.5)
CHLORIDE SERPL-SCNC: 108 MMOL/L — SIGNIFICANT CHANGE UP (ref 96–108)
CO2 SERPL-SCNC: 21 MMOL/L — LOW (ref 22–31)
CREAT SERPL-MCNC: 0.83 MG/DL — SIGNIFICANT CHANGE UP (ref 0.5–1.3)
GAS PNL BLDA: SIGNIFICANT CHANGE UP
GLUCOSE SERPL-MCNC: 112 MG/DL — HIGH (ref 70–99)
HCT VFR BLD CALC: 26.6 % — LOW (ref 34.5–45)
HGB BLD-MCNC: 8.9 G/DL — LOW (ref 11.5–15.5)
MAGNESIUM SERPL-MCNC: 1.6 MG/DL — SIGNIFICANT CHANGE UP (ref 1.6–2.6)
MCHC RBC-ENTMCNC: 28.4 PG — SIGNIFICANT CHANGE UP (ref 27–34)
MCHC RBC-ENTMCNC: 33.4 GM/DL — SIGNIFICANT CHANGE UP (ref 32–36)
MCV RBC AUTO: 85.2 FL — SIGNIFICANT CHANGE UP (ref 80–100)
PHOSPHATE SERPL-MCNC: 3.2 MG/DL — SIGNIFICANT CHANGE UP (ref 2.5–4.5)
PLATELET # BLD AUTO: 360 K/UL — SIGNIFICANT CHANGE UP (ref 150–400)
POTASSIUM SERPL-MCNC: 3.6 MMOL/L — SIGNIFICANT CHANGE UP (ref 3.5–5.3)
POTASSIUM SERPL-SCNC: 3.6 MMOL/L — SIGNIFICANT CHANGE UP (ref 3.5–5.3)
RBC # BLD: 3.12 M/UL — LOW (ref 3.8–5.2)
RBC # FLD: 14.7 % — HIGH (ref 10.3–14.5)
SODIUM SERPL-SCNC: 142 MMOL/L — SIGNIFICANT CHANGE UP (ref 135–145)
VANCOMYCIN TROUGH SERPL-MCNC: 16.2 UG/ML — SIGNIFICANT CHANGE UP (ref 10–20)
VANCOMYCIN TROUGH SERPL-MCNC: 20.5 UG/ML — HIGH (ref 10–20)
WBC # BLD: 9.7 K/UL — SIGNIFICANT CHANGE UP (ref 3.8–10.5)
WBC # FLD AUTO: 9.7 K/UL — SIGNIFICANT CHANGE UP (ref 3.8–10.5)

## 2017-04-25 PROCEDURE — 99232 SBSQ HOSP IP/OBS MODERATE 35: CPT

## 2017-04-25 PROCEDURE — 99233 SBSQ HOSP IP/OBS HIGH 50: CPT

## 2017-04-25 PROCEDURE — 71010: CPT | Mod: 26

## 2017-04-25 PROCEDURE — 93321 DOPPLER ECHO F-UP/LMTD STD: CPT | Mod: 26

## 2017-04-25 PROCEDURE — 93308 TTE F-UP OR LMTD: CPT | Mod: 26

## 2017-04-25 RX ORDER — POTASSIUM CHLORIDE 20 MEQ
20 PACKET (EA) ORAL ONCE
Qty: 0 | Refills: 0 | Status: COMPLETED | OUTPATIENT
Start: 2017-04-25 | End: 2017-04-25

## 2017-04-25 RX ORDER — DEXTROSE 50 % IN WATER 50 %
25 SYRINGE (ML) INTRAVENOUS ONCE
Qty: 0 | Refills: 0 | Status: DISCONTINUED | OUTPATIENT
Start: 2017-04-25 | End: 2017-04-28

## 2017-04-25 RX ORDER — DOCUSATE SODIUM 100 MG
100 CAPSULE ORAL
Qty: 0 | Refills: 0 | Status: DISCONTINUED | OUTPATIENT
Start: 2017-04-25 | End: 2017-04-28

## 2017-04-25 RX ORDER — VANCOMYCIN HCL 1 G
1250 VIAL (EA) INTRAVENOUS EVERY 24 HOURS
Qty: 0 | Refills: 0 | Status: DISCONTINUED | OUTPATIENT
Start: 2017-04-25 | End: 2017-04-28

## 2017-04-25 RX ORDER — INSULIN GLARGINE 100 [IU]/ML
8 INJECTION, SOLUTION SUBCUTANEOUS AT BEDTIME
Qty: 0 | Refills: 0 | Status: DISCONTINUED | OUTPATIENT
Start: 2017-04-25 | End: 2017-04-25

## 2017-04-25 RX ORDER — HEPARIN SODIUM 5000 [USP'U]/ML
5000 INJECTION INTRAVENOUS; SUBCUTANEOUS EVERY 8 HOURS
Qty: 0 | Refills: 0 | Status: DISCONTINUED | OUTPATIENT
Start: 2017-04-25 | End: 2017-04-28

## 2017-04-25 RX ORDER — INSULIN LISPRO 100/ML
VIAL (ML) SUBCUTANEOUS
Qty: 0 | Refills: 0 | Status: DISCONTINUED | OUTPATIENT
Start: 2017-04-25 | End: 2017-04-28

## 2017-04-25 RX ORDER — ATORVASTATIN CALCIUM 80 MG/1
80 TABLET, FILM COATED ORAL AT BEDTIME
Qty: 0 | Refills: 0 | Status: DISCONTINUED | OUTPATIENT
Start: 2017-04-25 | End: 2017-04-28

## 2017-04-25 RX ORDER — COLLAGENASE CLOSTRIDIUM HIST. 250 UNIT/G
1 OINTMENT (GRAM) TOPICAL DAILY
Qty: 0 | Refills: 0 | Status: DISCONTINUED | OUTPATIENT
Start: 2017-04-25 | End: 2017-04-28

## 2017-04-25 RX ORDER — GLUCAGON INJECTION, SOLUTION 0.5 MG/.1ML
1 INJECTION, SOLUTION SUBCUTANEOUS ONCE
Qty: 0 | Refills: 0 | Status: DISCONTINUED | OUTPATIENT
Start: 2017-04-25 | End: 2017-04-28

## 2017-04-25 RX ORDER — DEXTROSE 50 % IN WATER 50 %
1 SYRINGE (ML) INTRAVENOUS ONCE
Qty: 0 | Refills: 0 | Status: DISCONTINUED | OUTPATIENT
Start: 2017-04-25 | End: 2017-04-28

## 2017-04-25 RX ORDER — VANCOMYCIN HCL 1 G
750 VIAL (EA) INTRAVENOUS EVERY 12 HOURS
Qty: 0 | Refills: 0 | Status: DISCONTINUED | OUTPATIENT
Start: 2017-04-25 | End: 2017-04-25

## 2017-04-25 RX ORDER — METOPROLOL TARTRATE 50 MG
50 TABLET ORAL
Qty: 0 | Refills: 0 | Status: DISCONTINUED | OUTPATIENT
Start: 2017-04-25 | End: 2017-04-28

## 2017-04-25 RX ORDER — ASPIRIN/CALCIUM CARB/MAGNESIUM 324 MG
324 TABLET ORAL DAILY
Qty: 0 | Refills: 0 | Status: DISCONTINUED | OUTPATIENT
Start: 2017-04-25 | End: 2017-04-26

## 2017-04-25 RX ORDER — MAGNESIUM SULFATE 500 MG/ML
1 VIAL (ML) INJECTION ONCE
Qty: 0 | Refills: 0 | Status: COMPLETED | OUTPATIENT
Start: 2017-04-25 | End: 2017-04-25

## 2017-04-25 RX ORDER — SODIUM CHLORIDE 9 MG/ML
1000 INJECTION, SOLUTION INTRAVENOUS
Qty: 0 | Refills: 0 | Status: DISCONTINUED | OUTPATIENT
Start: 2017-04-25 | End: 2017-04-28

## 2017-04-25 RX ORDER — INSULIN GLARGINE 100 [IU]/ML
6 INJECTION, SOLUTION SUBCUTANEOUS AT BEDTIME
Qty: 0 | Refills: 0 | Status: DISCONTINUED | OUTPATIENT
Start: 2017-04-25 | End: 2017-04-26

## 2017-04-25 RX ORDER — DIGOXIN 250 MCG
0.12 TABLET ORAL DAILY
Qty: 0 | Refills: 0 | Status: DISCONTINUED | OUTPATIENT
Start: 2017-04-25 | End: 2017-04-28

## 2017-04-25 RX ORDER — DEXTROSE 50 % IN WATER 50 %
12.5 SYRINGE (ML) INTRAVENOUS ONCE
Qty: 0 | Refills: 0 | Status: DISCONTINUED | OUTPATIENT
Start: 2017-04-25 | End: 2017-04-28

## 2017-04-25 RX ORDER — INSULIN LISPRO 100/ML
VIAL (ML) SUBCUTANEOUS
Qty: 0 | Refills: 0 | Status: DISCONTINUED | OUTPATIENT
Start: 2017-04-25 | End: 2017-04-25

## 2017-04-25 RX ORDER — MINERAL OIL
133 OIL (ML) MISCELLANEOUS
Qty: 0 | Refills: 0 | Status: DISCONTINUED | OUTPATIENT
Start: 2017-04-25 | End: 2017-04-27

## 2017-04-25 RX ADMIN — Medication 50 MILLIGRAM(S): at 18:38

## 2017-04-25 RX ADMIN — ATORVASTATIN CALCIUM 80 MILLIGRAM(S): 80 TABLET, FILM COATED ORAL at 22:19

## 2017-04-25 RX ADMIN — Medication 324 MILLIGRAM(S): at 13:57

## 2017-04-25 RX ADMIN — HEPARIN SODIUM 5000 UNIT(S): 5000 INJECTION INTRAVENOUS; SUBCUTANEOUS at 22:19

## 2017-04-25 RX ADMIN — Medication 100 GRAM(S): at 04:36

## 2017-04-25 RX ADMIN — Medication 20 MILLIEQUIVALENT(S): at 04:37

## 2017-04-25 RX ADMIN — PANTOPRAZOLE SODIUM 40 MILLIGRAM(S): 20 TABLET, DELAYED RELEASE ORAL at 13:57

## 2017-04-25 RX ADMIN — INSULIN GLARGINE 6 UNIT(S): 100 INJECTION, SOLUTION SUBCUTANEOUS at 22:49

## 2017-04-25 RX ADMIN — Medication 2: at 22:18

## 2017-04-25 RX ADMIN — Medication 4: at 18:39

## 2017-04-26 LAB — VANCOMYCIN TROUGH SERPL-MCNC: 12.8 UG/ML — SIGNIFICANT CHANGE UP (ref 10–20)

## 2017-04-26 PROCEDURE — 99233 SBSQ HOSP IP/OBS HIGH 50: CPT | Mod: GC

## 2017-04-26 PROCEDURE — 71010: CPT | Mod: 26

## 2017-04-26 RX ORDER — INSULIN LISPRO 100/ML
3 VIAL (ML) SUBCUTANEOUS ONCE
Qty: 0 | Refills: 0 | Status: COMPLETED | OUTPATIENT
Start: 2017-04-26 | End: 2017-04-26

## 2017-04-26 RX ORDER — ASPIRIN AND DIPYRIDAMOLE 25; 200 MG/1; MG/1
1 CAPSULE, EXTENDED RELEASE ORAL
Qty: 0 | Refills: 0 | Status: DISCONTINUED | OUTPATIENT
Start: 2017-04-27 | End: 2017-04-28

## 2017-04-26 RX ORDER — ACETAMINOPHEN 500 MG
650 TABLET ORAL EVERY 6 HOURS
Qty: 0 | Refills: 0 | Status: DISCONTINUED | OUTPATIENT
Start: 2017-04-26 | End: 2017-04-28

## 2017-04-26 RX ORDER — INSULIN LISPRO 100/ML
2 VIAL (ML) SUBCUTANEOUS
Qty: 0 | Refills: 0 | Status: DISCONTINUED | OUTPATIENT
Start: 2017-04-26 | End: 2017-04-28

## 2017-04-26 RX ORDER — INSULIN GLARGINE 100 [IU]/ML
8 INJECTION, SOLUTION SUBCUTANEOUS AT BEDTIME
Qty: 0 | Refills: 0 | Status: DISCONTINUED | OUTPATIENT
Start: 2017-04-26 | End: 2017-04-28

## 2017-04-26 RX ADMIN — Medication 166.67 MILLIGRAM(S): at 23:10

## 2017-04-26 RX ADMIN — HEPARIN SODIUM 5000 UNIT(S): 5000 INJECTION INTRAVENOUS; SUBCUTANEOUS at 15:52

## 2017-04-26 RX ADMIN — Medication 0.12 MILLIGRAM(S): at 05:01

## 2017-04-26 RX ADMIN — Medication: at 12:30

## 2017-04-26 RX ADMIN — Medication 2 UNIT(S): at 17:39

## 2017-04-26 RX ADMIN — Medication 650 MILLIGRAM(S): at 15:00

## 2017-04-26 RX ADMIN — ATORVASTATIN CALCIUM 80 MILLIGRAM(S): 80 TABLET, FILM COATED ORAL at 22:58

## 2017-04-26 RX ADMIN — INSULIN GLARGINE 8 UNIT(S): 100 INJECTION, SOLUTION SUBCUTANEOUS at 23:09

## 2017-04-26 RX ADMIN — Medication 50 MILLIGRAM(S): at 05:00

## 2017-04-26 RX ADMIN — Medication 100 MILLIGRAM(S): at 17:39

## 2017-04-26 RX ADMIN — Medication 324 MILLIGRAM(S): at 15:51

## 2017-04-26 RX ADMIN — Medication 3 UNIT(S): at 01:29

## 2017-04-26 RX ADMIN — Medication 50 MILLIGRAM(S): at 17:39

## 2017-04-26 RX ADMIN — Medication 40 MILLIGRAM(S): at 15:51

## 2017-04-26 RX ADMIN — HEPARIN SODIUM 5000 UNIT(S): 5000 INJECTION INTRAVENOUS; SUBCUTANEOUS at 05:01

## 2017-04-26 RX ADMIN — HEPARIN SODIUM 5000 UNIT(S): 5000 INJECTION INTRAVENOUS; SUBCUTANEOUS at 22:58

## 2017-04-26 RX ADMIN — Medication: at 08:00

## 2017-04-26 RX ADMIN — PANTOPRAZOLE SODIUM 40 MILLIGRAM(S): 20 TABLET, DELAYED RELEASE ORAL at 15:53

## 2017-04-26 RX ADMIN — Medication 650 MILLIGRAM(S): at 14:30

## 2017-04-26 RX ADMIN — Medication 1 APPLICATION(S): at 15:52

## 2017-04-26 NOTE — PROVIDER CONTACT NOTE (OTHER) - ASSESSMENT
Pt asymptomatic, no c/o CP/Palpitations/SOB/dizziness.  Temp: 98.9 F oral  HR: 83 bpm  BP: 132/63 mmhg  O2 sat 96% room air

## 2017-04-27 LAB
ANION GAP SERPL CALC-SCNC: 12 MMOL/L — SIGNIFICANT CHANGE UP (ref 5–17)
BUN SERPL-MCNC: 22 MG/DL — SIGNIFICANT CHANGE UP (ref 7–23)
CALCIUM SERPL-MCNC: 8.7 MG/DL — SIGNIFICANT CHANGE UP (ref 8.4–10.5)
CHLORIDE SERPL-SCNC: 103 MMOL/L — SIGNIFICANT CHANGE UP (ref 96–108)
CO2 SERPL-SCNC: 20 MMOL/L — LOW (ref 22–31)
CREAT SERPL-MCNC: 1.16 MG/DL — SIGNIFICANT CHANGE UP (ref 0.5–1.3)
GLUCOSE SERPL-MCNC: 240 MG/DL — HIGH (ref 70–99)
HCT VFR BLD CALC: 29.5 % — LOW (ref 34.5–45)
HGB BLD-MCNC: 9.2 G/DL — LOW (ref 11.5–15.5)
MAGNESIUM SERPL-MCNC: 1.9 MG/DL — SIGNIFICANT CHANGE UP (ref 1.6–2.6)
MCHC RBC-ENTMCNC: 26.2 PG — LOW (ref 27–34)
MCHC RBC-ENTMCNC: 31.2 GM/DL — LOW (ref 32–36)
MCV RBC AUTO: 84 FL — SIGNIFICANT CHANGE UP (ref 80–100)
PHOSPHATE SERPL-MCNC: 2.9 MG/DL — SIGNIFICANT CHANGE UP (ref 2.5–4.5)
PLATELET # BLD AUTO: 284 K/UL — SIGNIFICANT CHANGE UP (ref 150–400)
POTASSIUM SERPL-MCNC: 4.6 MMOL/L — SIGNIFICANT CHANGE UP (ref 3.5–5.3)
POTASSIUM SERPL-SCNC: 4.6 MMOL/L — SIGNIFICANT CHANGE UP (ref 3.5–5.3)
RBC # BLD: 3.51 M/UL — LOW (ref 3.8–5.2)
RBC # FLD: 16.9 % — HIGH (ref 10.3–14.5)
SODIUM SERPL-SCNC: 135 MMOL/L — SIGNIFICANT CHANGE UP (ref 135–145)
WBC # BLD: 8.47 K/UL — SIGNIFICANT CHANGE UP (ref 3.8–10.5)
WBC # FLD AUTO: 8.47 K/UL — SIGNIFICANT CHANGE UP (ref 3.8–10.5)

## 2017-04-27 PROCEDURE — 99233 SBSQ HOSP IP/OBS HIGH 50: CPT | Mod: GC

## 2017-04-27 PROCEDURE — 99223 1ST HOSP IP/OBS HIGH 75: CPT

## 2017-04-27 PROCEDURE — 99497 ADVNCD CARE PLAN 30 MIN: CPT | Mod: 25

## 2017-04-27 PROCEDURE — 71010: CPT | Mod: 26

## 2017-04-27 RX ORDER — SOLIFENACIN SUCCINATE 10 MG/1
1 TABLET ORAL
Qty: 0 | Refills: 0 | COMMUNITY

## 2017-04-27 RX ORDER — DOCUSATE SODIUM 100 MG
1 CAPSULE ORAL
Qty: 60 | Refills: 0 | OUTPATIENT
Start: 2017-04-27 | End: 2017-05-27

## 2017-04-27 RX ORDER — ATORVASTATIN CALCIUM 80 MG/1
1 TABLET, FILM COATED ORAL
Qty: 0 | Refills: 0 | COMMUNITY
Start: 2017-04-27

## 2017-04-27 RX ORDER — MINERAL OIL
133 OIL (ML) MISCELLANEOUS
Qty: 1 | Refills: 0 | OUTPATIENT
Start: 2017-04-27 | End: 2017-05-27

## 2017-04-27 RX ORDER — CAPTOPRIL 12.5 MG/1
1 TABLET ORAL
Qty: 60 | Refills: 0 | OUTPATIENT
Start: 2017-04-27 | End: 2017-05-27

## 2017-04-27 RX ORDER — FUROSEMIDE 40 MG
40 TABLET ORAL DAILY
Qty: 0 | Refills: 0 | Status: DISCONTINUED | OUTPATIENT
Start: 2017-04-27 | End: 2017-04-28

## 2017-04-27 RX ORDER — METOPROLOL TARTRATE 50 MG
1 TABLET ORAL
Qty: 60 | Refills: 0 | OUTPATIENT
Start: 2017-04-27 | End: 2017-05-27

## 2017-04-27 RX ORDER — FUROSEMIDE 40 MG
1 TABLET ORAL
Qty: 30 | Refills: 0 | OUTPATIENT
Start: 2017-04-27 | End: 2017-05-27

## 2017-04-27 RX ORDER — MINERAL OIL
133 OIL (ML) MISCELLANEOUS
Qty: 0 | Refills: 0 | Status: DISCONTINUED | OUTPATIENT
Start: 2017-04-27 | End: 2017-04-28

## 2017-04-27 RX ORDER — LOPERAMIDE HCL 2 MG
0 TABLET ORAL
Qty: 0 | Refills: 0 | COMMUNITY

## 2017-04-27 RX ORDER — DIGOXIN 250 MCG
1 TABLET ORAL
Qty: 30 | Refills: 0 | OUTPATIENT
Start: 2017-04-27 | End: 2017-05-27

## 2017-04-27 RX ADMIN — Medication 50 MILLIGRAM(S): at 05:55

## 2017-04-27 RX ADMIN — Medication 2 UNIT(S): at 08:04

## 2017-04-27 RX ADMIN — HEPARIN SODIUM 5000 UNIT(S): 5000 INJECTION INTRAVENOUS; SUBCUTANEOUS at 14:55

## 2017-04-27 RX ADMIN — Medication 2: at 22:03

## 2017-04-27 RX ADMIN — ASPIRIN AND DIPYRIDAMOLE 1 CAPSULE(S): 25; 200 CAPSULE, EXTENDED RELEASE ORAL at 18:22

## 2017-04-27 RX ADMIN — Medication 40 MILLIGRAM(S): at 12:13

## 2017-04-27 RX ADMIN — Medication 0.12 MILLIGRAM(S): at 05:55

## 2017-04-27 RX ADMIN — Medication 50 MILLIGRAM(S): at 18:22

## 2017-04-27 RX ADMIN — ASPIRIN AND DIPYRIDAMOLE 1 CAPSULE(S): 25; 200 CAPSULE, EXTENDED RELEASE ORAL at 07:57

## 2017-04-27 RX ADMIN — HEPARIN SODIUM 5000 UNIT(S): 5000 INJECTION INTRAVENOUS; SUBCUTANEOUS at 22:02

## 2017-04-27 RX ADMIN — HEPARIN SODIUM 5000 UNIT(S): 5000 INJECTION INTRAVENOUS; SUBCUTANEOUS at 05:55

## 2017-04-27 RX ADMIN — Medication 40 MILLIGRAM(S): at 14:57

## 2017-04-27 RX ADMIN — Medication 2: at 08:04

## 2017-04-27 RX ADMIN — Medication 3: at 18:22

## 2017-04-27 RX ADMIN — Medication 2 UNIT(S): at 18:20

## 2017-04-27 RX ADMIN — INSULIN GLARGINE 8 UNIT(S): 100 INJECTION, SOLUTION SUBCUTANEOUS at 22:02

## 2017-04-27 RX ADMIN — Medication 2 UNIT(S): at 12:11

## 2017-04-27 RX ADMIN — Medication 1: at 12:11

## 2017-04-27 RX ADMIN — Medication 166.67 MILLIGRAM(S): at 23:22

## 2017-04-27 RX ADMIN — ATORVASTATIN CALCIUM 80 MILLIGRAM(S): 80 TABLET, FILM COATED ORAL at 22:02

## 2017-04-27 RX ADMIN — Medication 1 APPLICATION(S): at 12:13

## 2017-04-27 NOTE — PROVIDER CONTACT NOTE (OTHER) - ASSESSMENT
Pt in no acute distress VS Stable. Denies Chest Pain / SOB Left Arm non wheeping swelling /+3 edema, I elevated Left Arm and placed a pink band on for protection. Pt in no acute distress VS Stable. Denies Chest Pain / SOB Left Arm non wheeping swelling /+3 edema, I elevated Left Arm and placed a pink band on for protection. + radial pulse + cap refill

## 2017-04-27 NOTE — PROVIDER CONTACT NOTE (OTHER) - RECOMMENDATIONS
Assess patient. Review patient's orders and lab results.
? to give am dose of vancomycin. ? any insulin
Admin D50% Dextrose.
Assess patient, review labs. Continue tele monitoring.
Assess patient, review labs. Continue to monitor.
Assess patient, review labs. Continue to monitor.
Clarify orders, review medications.
Come see pt
hold dose of vanco. will discuss with team.

## 2017-04-28 VITALS
OXYGEN SATURATION: 97 % | DIASTOLIC BLOOD PRESSURE: 62 MMHG | TEMPERATURE: 98 F | RESPIRATION RATE: 18 BRPM | SYSTOLIC BLOOD PRESSURE: 145 MMHG | HEART RATE: 73 BPM

## 2017-04-28 PROCEDURE — 93312 ECHO TRANSESOPHAGEAL: CPT

## 2017-04-28 PROCEDURE — 71010: CPT

## 2017-04-28 PROCEDURE — 87086 URINE CULTURE/COLONY COUNT: CPT

## 2017-04-28 PROCEDURE — P9016: CPT

## 2017-04-28 PROCEDURE — P9047: CPT

## 2017-04-28 PROCEDURE — 84484 ASSAY OF TROPONIN QUANT: CPT

## 2017-04-28 PROCEDURE — 82803 BLOOD GASES ANY COMBINATION: CPT

## 2017-04-28 PROCEDURE — 93308 TTE F-UP OR LMTD: CPT

## 2017-04-28 PROCEDURE — 82330 ASSAY OF CALCIUM: CPT

## 2017-04-28 PROCEDURE — 82435 ASSAY OF BLOOD CHLORIDE: CPT

## 2017-04-28 PROCEDURE — 93005 ELECTROCARDIOGRAM TRACING: CPT

## 2017-04-28 PROCEDURE — 82746 ASSAY OF FOLIC ACID SERUM: CPT

## 2017-04-28 PROCEDURE — C1769: CPT

## 2017-04-28 PROCEDURE — 97164 PT RE-EVAL EST PLAN CARE: CPT

## 2017-04-28 PROCEDURE — 96374 THER/PROPH/DIAG INJ IV PUSH: CPT | Mod: XU

## 2017-04-28 PROCEDURE — 87186 SC STD MICRODIL/AGAR DIL: CPT

## 2017-04-28 PROCEDURE — 99239 HOSP IP/OBS DSCHRG MGMT >30: CPT

## 2017-04-28 PROCEDURE — 93325 DOPPLER ECHO COLOR FLOW MAPG: CPT

## 2017-04-28 PROCEDURE — 74177 CT ABD & PELVIS W/CONTRAST: CPT

## 2017-04-28 PROCEDURE — 83690 ASSAY OF LIPASE: CPT

## 2017-04-28 PROCEDURE — 86900 BLOOD TYPING SEROLOGIC ABO: CPT

## 2017-04-28 PROCEDURE — 76775 US EXAM ABDO BACK WALL LIM: CPT

## 2017-04-28 PROCEDURE — 87040 BLOOD CULTURE FOR BACTERIA: CPT

## 2017-04-28 PROCEDURE — 86140 C-REACTIVE PROTEIN: CPT

## 2017-04-28 PROCEDURE — 82607 VITAMIN B-12: CPT

## 2017-04-28 PROCEDURE — 84100 ASSAY OF PHOSPHORUS: CPT

## 2017-04-28 PROCEDURE — 36430 TRANSFUSION BLD/BLD COMPNT: CPT

## 2017-04-28 PROCEDURE — 82570 ASSAY OF URINE CREATININE: CPT

## 2017-04-28 PROCEDURE — 92610 EVALUATE SWALLOWING FUNCTION: CPT

## 2017-04-28 PROCEDURE — 73610 X-RAY EXAM OF ANKLE: CPT

## 2017-04-28 PROCEDURE — 80162 ASSAY OF DIGOXIN TOTAL: CPT

## 2017-04-28 PROCEDURE — 85014 HEMATOCRIT: CPT

## 2017-04-28 PROCEDURE — 80048 BASIC METABOLIC PNL TOTAL CA: CPT

## 2017-04-28 PROCEDURE — 80202 ASSAY OF VANCOMYCIN: CPT

## 2017-04-28 PROCEDURE — 84443 ASSAY THYROID STIM HORMONE: CPT

## 2017-04-28 PROCEDURE — 70450 CT HEAD/BRAIN W/O DYE: CPT

## 2017-04-28 PROCEDURE — 84300 ASSAY OF URINE SODIUM: CPT

## 2017-04-28 PROCEDURE — 84295 ASSAY OF SERUM SODIUM: CPT

## 2017-04-28 PROCEDURE — 80053 COMPREHEN METABOLIC PANEL: CPT

## 2017-04-28 PROCEDURE — 85384 FIBRINOGEN ACTIVITY: CPT

## 2017-04-28 PROCEDURE — 99285 EMERGENCY DEPT VISIT HI MDM: CPT | Mod: 25

## 2017-04-28 PROCEDURE — 76000 FLUOROSCOPY <1 HR PHYS/QHP: CPT

## 2017-04-28 PROCEDURE — 80307 DRUG TEST PRSMV CHEM ANLYZR: CPT

## 2017-04-28 PROCEDURE — 97112 NEUROMUSCULAR REEDUCATION: CPT

## 2017-04-28 PROCEDURE — 97110 THERAPEUTIC EXERCISES: CPT

## 2017-04-28 PROCEDURE — 74000: CPT

## 2017-04-28 PROCEDURE — 86850 RBC ANTIBODY SCREEN: CPT

## 2017-04-28 PROCEDURE — 84133 ASSAY OF URINE POTASSIUM: CPT

## 2017-04-28 PROCEDURE — 71045 X-RAY EXAM CHEST 1 VIEW: CPT

## 2017-04-28 PROCEDURE — 93321 DOPPLER ECHO F-UP/LMTD STD: CPT

## 2017-04-28 PROCEDURE — 83605 ASSAY OF LACTIC ACID: CPT

## 2017-04-28 PROCEDURE — 85027 COMPLETE CBC AUTOMATED: CPT

## 2017-04-28 PROCEDURE — 83935 ASSAY OF URINE OSMOLALITY: CPT

## 2017-04-28 PROCEDURE — 97530 THERAPEUTIC ACTIVITIES: CPT

## 2017-04-28 PROCEDURE — 74018 RADEX ABDOMEN 1 VIEW: CPT

## 2017-04-28 PROCEDURE — 82550 ASSAY OF CK (CPK): CPT

## 2017-04-28 PROCEDURE — 80076 HEPATIC FUNCTION PANEL: CPT

## 2017-04-28 PROCEDURE — 85652 RBC SED RATE AUTOMATED: CPT

## 2017-04-28 PROCEDURE — 96372 THER/PROPH/DIAG INJ SC/IM: CPT | Mod: XU

## 2017-04-28 PROCEDURE — 86923 COMPATIBILITY TEST ELECTRIC: CPT

## 2017-04-28 PROCEDURE — 85610 PROTHROMBIN TIME: CPT

## 2017-04-28 PROCEDURE — 86901 BLOOD TYPING SEROLOGIC RH(D): CPT

## 2017-04-28 PROCEDURE — 82553 CREATINE MB FRACTION: CPT

## 2017-04-28 PROCEDURE — 97602 WOUND(S) CARE NON-SELECTIVE: CPT

## 2017-04-28 PROCEDURE — 97162 PT EVAL MOD COMPLEX 30 MIN: CPT

## 2017-04-28 PROCEDURE — 96375 TX/PRO/DX INJ NEW DRUG ADDON: CPT

## 2017-04-28 PROCEDURE — 70250 X-RAY EXAM OF SKULL: CPT

## 2017-04-28 PROCEDURE — 81001 URINALYSIS AUTO W/SCOPE: CPT

## 2017-04-28 PROCEDURE — 93923 UPR/LXTR ART STDY 3+ LVLS: CPT

## 2017-04-28 PROCEDURE — 85730 THROMBOPLASTIN TIME PARTIAL: CPT

## 2017-04-28 PROCEDURE — 93306 TTE W/DOPPLER COMPLETE: CPT

## 2017-04-28 PROCEDURE — 83735 ASSAY OF MAGNESIUM: CPT

## 2017-04-28 PROCEDURE — 93320 DOPPLER ECHO COMPLETE: CPT

## 2017-04-28 PROCEDURE — 82947 ASSAY GLUCOSE BLOOD QUANT: CPT

## 2017-04-28 PROCEDURE — 84132 ASSAY OF SERUM POTASSIUM: CPT

## 2017-04-28 PROCEDURE — 83036 HEMOGLOBIN GLYCOSYLATED A1C: CPT

## 2017-04-28 PROCEDURE — 82728 ASSAY OF FERRITIN: CPT

## 2017-04-28 PROCEDURE — 83550 IRON BINDING TEST: CPT

## 2017-04-28 RX ADMIN — ASPIRIN AND DIPYRIDAMOLE 1 CAPSULE(S): 25; 200 CAPSULE, EXTENDED RELEASE ORAL at 07:53

## 2017-04-28 RX ADMIN — Medication 40 MILLIGRAM(S): at 11:42

## 2017-04-28 RX ADMIN — Medication 2: at 07:53

## 2017-04-28 RX ADMIN — Medication 2 UNIT(S): at 11:42

## 2017-04-28 RX ADMIN — Medication 50 MILLIGRAM(S): at 05:29

## 2017-04-28 RX ADMIN — Medication 1: at 11:42

## 2017-04-28 RX ADMIN — HEPARIN SODIUM 5000 UNIT(S): 5000 INJECTION INTRAVENOUS; SUBCUTANEOUS at 05:29

## 2017-04-28 RX ADMIN — Medication 40 MILLIGRAM(S): at 05:29

## 2017-04-28 RX ADMIN — Medication 2 UNIT(S): at 07:53

## 2017-04-28 RX ADMIN — Medication 0.12 MILLIGRAM(S): at 05:29

## 2017-04-28 NOTE — PROVIDER CONTACT NOTE (OTHER) - DATE AND TIME:
26-Apr-2017 04:43
27-Apr-2017 04:32
28-Apr-2017 04:27
14-Apr-2017 00:38
14-Apr-2017 05:05
15-Apr-2017 04:15
16-Apr-2017 09:00
17-Apr-2017 06:28
20-Apr-2017 06:25
24-Apr-2017 02:00
25-Apr-2017 12:43
27-Apr-2017 12:24
28-Apr-2017 03:45
23-Apr-2017 18:20

## 2017-04-28 NOTE — PROVIDER CONTACT NOTE (OTHER) - NAME OF MD/NP/PA/DO NOTIFIED:
MD Arielle
Avelino Guzmán
Avelino Veloz (Even Teams - Team 6).
Dr Arielle You
Dr. Henriquez Team 6 notified.
Erin Concepcion MD
Erin Concepcion MD
MAGALI Pro MD
MAGALI Pro MD
MD Muniz Team 6
MD Oseguera Team 6
Milly Tristan,NP
Mellissa Russell MD
Avelino Guzmán MD

## 2017-04-28 NOTE — PROVIDER CONTACT NOTE (OTHER) - SITUATION
Notified provider pt medication aggrenox can not be crushed as per pharmacy. Pt takes medications crushed with applesauce. Pt can not swallow colace.
Notified provider pt medication Aggrenox cant not be crushed as per pharmacy. Pt takes medications crushed.
vanco trough 21.4, hold vanco?
13 Beats WCT
AIVR
Notified provider pt had 25 beats of wide complex.
Orders for enema four times a day
Patient had 10 beats of Wide Complex Tachycardia on the cardiac monitor and patient's Heart Rate was 124 on the cardiac monitor at that time.
Pt had 7 beats WCT
Pt had 9 beats WCT
Pt hypoglcemic but AOx 2; gave pt 180oz of apple juice and rechecked in 15 mins; pt was still hypoglycemic and the blood sugar was 55.
Pt with left arm non wheeping swelling edema.
WCT 10beats
blood sugar at 0100 is  360

## 2017-04-28 NOTE — GOALS OF CARE CONVERSATION - PERSONAL ADVANCE DIRECTIVE - CONVERSATION DETAILS
Discussed that given calcified pacemaker and chronic osteomyelitis, patient at high risk of decompensating despite long term abx, and that long term abx can lead to complications such as cdiff.  understands, and opted to forgo long term abx via PICC. Interested in home hospice. NIRU filled out, reflecting wishes for patient to be DNR/ DNI, limited medical interventions, and trial of IV fluids but no artificial nutrition.

## 2017-04-28 NOTE — PROVIDER CONTACT NOTE (OTHER) - BACKGROUND
4/13 abd pain 4/16 Blood cultures +staph; vanco IV 4/17 MRSA/Bacteremia w/ ICD 4/18 CRISTIN right vent. ICD Lead--> probable endocarditis  4/24 Attempted laser removal of ICD Lead 4/25 7 beats WCT
4/24- attempted AICD lead extraction
AMS, fecal impaction. MRSA Bacteremia   osteomyelitis RLE
Dx: Fecal impaction
Pt admitted for AMS
Pt admitted for AMS
Pt admitted for AMS, fecal impaction.
Pt admitted for change in mental status
Pt admitted with altered mental status.
Pt admitted with altered mental status.
Pt had AICD removed  4/24 Noted reddness near removal site
Pt with fecal impaction.
Pt admitted with altered mental status.
Patient admitted for Altered Mental Status, Acute Kidney Injury, Colitis, Encephalopathy, Heart Failure, Depression, Diabetes Mellitus, Sepsis, Abdominal Pain.

## 2017-04-28 NOTE — PROVIDER CONTACT NOTE (OTHER) - REASON
13 beats WCT
AIVR
Blood sugar 360
Ectopy.
Enema order clarification
Left Arm Swelling/ Reddness near AICD Removal site
Pt had 7 beats WCT
Pt had beats wide complex
Pt hypoglycemic; blood glucose of 51 and repeated blood glucose of 55
WCT 
vanco trough 21.4, hold vanco?
Pt had 9 beats WCT
Pt medication

## 2017-04-28 NOTE — PROVIDER CONTACT NOTE (OTHER) - ACTION/TREATMENT ORDERED:
Reschedule aggrenox to 8A.M; MD to talk to day team.
Rescheduled medication to 8 AM as per MD.
MD Russell aware, will administer morning dose of Lopressor 50 mg PO and Digoxin 125 mcg PO as per orders. Will cont. to monitor HR/rhythm. Pt safety maintained. Call bell within easy reach.
MD aware and states no new orders at this time. Continue to monitor.
MD aware. RN will continue to monitor.
NP will order supplemental Potassium. No further orders at this time. Continue to monitor the pt.
Blood glucose reassessed and it is 269 when rechecked.
I will come see patient and notify EP lab. Continue to monitor patient
MD aware. No new orders at this time. Pt education provided. Will continue to monitor.
MD aware. No new orders at this time. Will continue to closely monitor.
MD aware. Orders to administer mineral oil enema first x 2 every 6 hours then saline enema x2 every 6 hours. States will clarify with attending and will place order. Will continue to monitor.
MD notified and aware. Lab orders noted. Continue tele monitoring
Okay to give am dose Vancomycin. will check about insulin
vanco held, will continue to monitor.

## 2017-05-17 ENCOUNTER — APPOINTMENT (OUTPATIENT)
Dept: OPHTHALMOLOGY | Facility: CLINIC | Age: 72
End: 2017-05-17

## 2018-11-28 NOTE — PHYSICAL THERAPY INITIAL EVALUATION ADULT - PHYSICAL ASSIST/NONPHYSICAL ASSIST: SCOOT/BRIDGE, REHAB EVAL
I think this is definitely traumatic.  I do not see any lesion around the scab and the skin looks normal other than inflamed.   It is where her radiation was done for her SCCA in the past.   We discussed the issue at length.   Will restart some doxycycline 100 bid for 10 days.   Mupirocin ointment to the wound with good wound care and I showed her how to do it.   F/u in 3-4 weeks to monitor her progress.  Elevation as much as possible Detail Level: Simple verbal cues/2 person assist/nonverbal cues (demo/gestures)

## 2019-12-30 NOTE — H&P ADULT. - PROBLEM SELECTOR PLAN 4
Keep dressings in place and wear surgical bra.  Avoid heavy lifting more than 5 lb.  Avoid strenuous activity and reaching overhead.  Avoid laying on sides.  Okay to shower after 3 days and remove outer dressings but do not remove underlying Steri-Strips.  Replace dry gauze pad over breast incisions incisions or ABD pad with the bra    DISCHARGE INSTRUCTIONS:   OUTPATIENT DEPARTMENT AND THE  [facility]    DIET  _x__ As tolerated  ___Other:    MEDICATIONS  _x__ Resume your usual medication.  _x__ Prescription given/filled.  ___ Take the medications that your doctor has prescribed for you.  ___ Avoid any aspirin or aspirin containing products for ____ days.    DRESSING  May shower after 3 days. Remove outer dressings before showering, leave steri-strips (white tapes) in place.     BATHING  ___ Sponge bath until after your first office visit.  _x__ Shower / Shampoo-starting Thursday  ___ Tub Bath  _x__ No swimming, hot tub or submersion until directed by physician.    ACTIVITY  _x__ Up as tolerated/light activity  _x__ No lifting more than: 5 lb  ___ Return to work or school:  ___ May drive a car:  ___ Other:    Patient/Family given For your Well Being Teaching Sheet:  _x__ Care After Anesthesia/ Sedation  __x_ Pain Management Tips  _x__ About Surgical Site infection  ___ Smoking and second hand Smoke information  ___ Other:     Supplies:    FOLLOW-UP CARE  _x__ Make an appointment to see your doctor as instructed  ___ Call for results in ______days    Notify your physician immediately if you experience any of the following symptoms:  • If the operative site is visible, observe the area for swelling, redness, warmth, or discoloration.  • Fever 101 degree or higher.  • Nausea or vomiting that persists longer than 24 hours.  • Pain not relieved by the prescribed medication.  • Heavy bleeding.  • Cloudy or foul smelling drainage.    If you have any questions, please call the [facility] at  [phone].        Sugammadex Solution for injection  What is this medicine?  SUGAMMADEX (matt LALIT ma dex) is used to reverse the effects of the muscle relaxants rocuronium and vecuronium. These drugs are given to patients during surgery.  This medicine may be used for other purposes; ask your health care provider or pharmacist if you have questions.  What should I tell my health care provider before I take this medicine?  They need to know if you have any of these conditions:  · bleeding disorders  · kidney disease  · slow heartbeat  · an unusual or allergic reaction to sugammadex, other medicines, foods, dyes, or preservatives  · pregnant or trying to get pregnant  · breast-feeding  How should I use this medicine?  This medicine is for injection into a vein. It is given by a health care professional in a hospital or clinic setting.  Overdosage: If you think you have taken too much of this medicine contact a poison control center or emergency room at once.  NOTE: This medicine is only for you. Do not share this medicine with others.  What if I miss a dose?  This does not apply.  What may interact with this medicine?  This medicine may interact with the following medicines:  · birth control pills, patches, rings, or injections  · toremifene  This list may not describe all possible interactions. Give your health care provider a list of all the medicines, herbs, non-prescription drugs, or dietary supplements you use. Also tell them if you smoke, drink alcohol, or use illegal drugs. Some items may interact with your medicine.  What should I watch for while using this medicine?  Your condition will be monitored carefully while you are receiving this medicine.  Birth control pills, patches, rings, or injections may not work properly while you are taking this medicine. Females should use an additional, non-hormonal method of birth control (such as condoms or spermicidal jelly) for 7 days after receiving this  medicine.  What side effects may I notice from receiving this medicine?  Side effects that you should report to your doctor or health care professional as soon as possible:  · allergic reaction like skin rash, itching or hives, swelling of the face, lips, or tongue  · breathing problems  · signs and symptoms of a dangerous change in heartbeat or heart rhythm like chest pain; dizziness; fast or irregular heartbeat; palpitations; feeling faint or lightheaded, falls; breathing problems  · signs and symptoms of low blood pressure or a slow heartbeat like dizziness; feeling faint or lightheaded, falls; unusually weak or tired  Side effects that usually do not require medical attention (Report these to your doctor or health care professional if they continue or are bothersome.):  · anxious  · headache  · muscle pain  · nausea  · pain at the injection site  · stomach pain  · vomiting  This list may not describe all possible side effects. Call your doctor for medical advice about side effects. You may report side effects to FDA at 9-758-FDA-8827.  Where should I keep my medicine?  This drug is given in a hospital or clinic and will not be stored at home.  NOTE: This sheet is a summary. It may not cover all possible information. If you have questions about this medicine, talk to your doctor, pharmacist, or health care provider.  NOTE:This sheet is a summary. It may not cover all possible information. If you have questions about this medicine, talk to your doctor, pharmacist, or health care provider. Copyright© 2016 [employer]   Patient with hx of DM1  -endocrinology c/s appreciated: at this time, lantus 14 U qhs, IVF D5W 1/2 NS, humalog sliding scale q4h  -ISS, FSG Patient with MIGUEL, with previous Cr of 0.9, likely 2/2 pre-renal in setting of decreased PO intake  -gentle IVF at this time  -f/u BMP in AM  -avoid nephrotoxins, renally dose medications

## 2022-10-14 NOTE — PROVIDER CONTACT NOTE (CRITICAL VALUE NOTIFICATION) - ACTION/TREATMENT ORDERED:
Informed spouse patient is overdue for a follow up appointment. She states she does not have insurance at this time. Suggested paying out of pocket cost for appointment, they are on a fixed income, unable to pay at this time. She would appreciate courtesy medication while she shops around for new insurance. Please advise.    MD aware. No new orders noted at this time. Will continue to monitor.

## 2023-04-07 NOTE — ED PROVIDER NOTE - NS ED MD EM SELECTION
03048 Comprehensive Colchicine Pregnancy And Lactation Text: This medication is Pregnancy Category C and isn't considered safe during pregnancy. It is excreted in breast milk.

## 2024-05-02 NOTE — PHYSICAL THERAPY INITIAL EVALUATION ADULT - DID THE PATIENT HAVE SURGERY?
History:    Past Surgical History:   Procedure Laterality Date    BACK SURGERY  03/06/2015    Lumbar fusion L4-L5    CARDIAC CATHETERIZATION      CARDIOVERSION N/A 2/23/2022    CARDIOVERSION performed by René Mendez DO at [facility] OR    COLONOSCOPY      DEBRIDEMENT Right 02/21/2018    Debridement and closure of right wrist wound with full thickness skin graft    LEG BIOPSY EXCISION Right 11/3/2023    REMOVAL FOREIGN BODY RIGHT LOWER LEG performed by Alen Walsh DPM at [facility] OR    NERVE BLOCK  05/18/2016    tens INIATED    NERVE BLOCK  07/14/2016    duramorph celestone 9mg morphine 1.5mg    NV REPAIR TENDON/MUSCLE UPPER ARM/ELBOW EA TDN/MUSC Right 02/21/2018    DEBRIDEMENT AND CLOSURE OF RIGHT WRIST WOUND WITH  FULL THICKNESS SKIN GRAFT performed by Leno Goodman MD at [facility] OR    PRESSURE ULCER DEBRIDEMENT N/A 2/21/2022    DEBRIDEMENT NECROTIC SACRAL WOUND performed by Leno Conway MD at [facility] OR    TRANSESOPHAGEAL ECHOCARDIOGRAM N/A 2/23/2022    TRANSESOPHAGEAL ECHOCARDIOGRAM performed by René Mendez DO at [facility] OR    UPPER GASTROINTESTINAL ENDOSCOPY          Medications:      Current Outpatient Medications:     diclofenac sodium (VOLTAREN) 1 % GEL, APPLY 4 GRAMS OF GEL TOPICALLY FOUR TIMES DAILY FOR 10 DAYS, Disp: , Rfl:     aspirin-acetaminophen-caffeine (EXCEDRIN MIGRAINE) 250-250-65 MG per tablet, Take 1 tablet by mouth every 6 hours as needed for Headaches, Disp: , Rfl:     JARDIANCE 10 MG tablet, TAKE 1 TABLET BY MOUTH DAILY, Disp: 90 tablet, Rfl: 0    DULoxetine (CYMBALTA) 30 MG extended release capsule, TAKE 1 CAPSULE BY MOUTH ONCE DAILY, Disp: 30 capsule, Rfl: 5    SITagliptin (JANUVIA) 50 MG tablet, Take 1 tablet by mouth daily, Disp: 30 tablet, Rfl: 5    apixaban (ELIQUIS) 5 MG TABS tablet, Take 1 tablet by mouth 2 times daily, Disp: 180 tablet, Rfl: 1    losartan (COZAAR) 100 MG tablet, Take 1 tablet by mouth daily, Disp: 90 tablet, Rfl: 3    metoprolol succinate (TOPROL XL) 50 MG extended release  n/a